# Patient Record
Sex: FEMALE | Race: WHITE | NOT HISPANIC OR LATINO | Employment: STUDENT | URBAN - METROPOLITAN AREA
[De-identification: names, ages, dates, MRNs, and addresses within clinical notes are randomized per-mention and may not be internally consistent; named-entity substitution may affect disease eponyms.]

---
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
## 2023-01-11 ENCOUNTER — SOCIAL WORK (OUTPATIENT)
Dept: BEHAVIORAL/MENTAL HEALTH CLINIC | Facility: CLINIC | Age: 18
End: 2023-01-11

## 2023-01-11 ENCOUNTER — TELEMEDICINE (OUTPATIENT)
Dept: PSYCHIATRY | Facility: CLINIC | Age: 18
End: 2023-01-11

## 2023-01-11 DIAGNOSIS — F43.10 POST TRAUMATIC STRESS DISORDER (PTSD): Primary | ICD-10-CM

## 2023-01-11 DIAGNOSIS — F41.1 GENERALIZED ANXIETY DISORDER: ICD-10-CM

## 2023-01-11 DIAGNOSIS — F60.3 BORDERLINE PERSONALITY DISORDER (HCC): ICD-10-CM

## 2023-01-11 DIAGNOSIS — F32.A DEPRESSION, UNSPECIFIED DEPRESSION TYPE: Primary | ICD-10-CM

## 2023-01-11 RX ORDER — LITHIUM CARBONATE 300 MG/1
300 CAPSULE ORAL
Qty: 30 CAPSULE | Refills: 1 | Status: SHIPPED | OUTPATIENT
Start: 2023-01-11

## 2023-01-11 NOTE — PSYCH
Encounter Diagnosis     ICD-10-CM    1  Post traumatic stress disorder (PTSD)  F43 10           Goals addressed in session: Goal 1     Pain:      none    0    Current suicide risk : Low     \[facility] [place] [facility]\"" engaged with the counselor around how things have been over the last few weeks  Lorenzo\[facility] [place] [facility]\"" explained that her grandfather recently passed away and that she has been having a hard time with dealing with his death  She explained that she does not deal with death well and explained that in the past when both of her grandmother's passed away she either attempted suicide or was hospitalized for ideations or self-harm  She explained that this time around she has had thoughts of self-harm however explained that Shakira Barraza has been helpful with keeping her safety and removing sharps from the home  Lorenzo\[facility] [place] [facility]\"" explained that she often avoids things that she does not want to feel and has a hard time accepting things as well  She talked about her relationship with her mother and explained that since her grandfather passed her mother has been around her more which makes \[facility] [place] [facility]\"" uncomfortable  She explained that she does not view her mother as a mom however she does wish that she had a mother that she could trust  \[facility] [place] [facility]\"" talked about the upcoming  and explained that she is not prepared to be there with her mom and other family members that she does not get along with however she will not miss it as she knows that she will regret it  The counselor talked with Brodie Marino and asked questions around why she avoids certain feelings and things she could do to let her emotions out in a way that will make her feel comfortable  \[facility] [place] [facility]\"" was oriented and neatly dressed, her eye contact and speech were normal, she denied all current safety concerns and denied any substance use  \[facility] [place] [facility]\"" was open and honest during the session and was able to answer harder questions without getting frustrated or shutting down  She was open to suggestions and feedback   A crisis plan was updated  Avril to work on boundary setting, doing things to safely let her emotions out, healthy communication with her supports, and coping skills  Future sessions scheduled  Behavioral Health Treatment Plan [facility]: Diagnosis and Treatment Plan explained to [facility] [place], [place] relates understanding diagnosis and is agreeable to Treatment Plan   Yes     Visit start and stop times:    01/11/23  Start Time: 1500  Stop Time: 1550  Total Visit Time: 50 minutes

## 2023-01-11 NOTE — PSYCH
Virtual Regular Visit    Verification of patient location:    Patient is located in the following state in which I hold an active license [place]      Assessment/Plan:    Problem List Items Addressed This Visit        Other    Depression - Primary    Relevant Medications    lithium carbonate 300 mg capsule    Anxiety disorder    Relevant Medications    lithium carbonate 300 mg capsule    Borderline personality disorder ([address] )    Relevant Medications    lithium carbonate 300 mg capsule       Goals addressed in session: Goal 1          Reason for visit is   Chief Complaint   Patient presents with   • Medication Management   • Follow-up        Encounter provider Valentino Pérez MD    Provider located at [id number] [facility]  [address]  [phone]      Recent Visits  No visits were found meeting these conditions  Showing recent visits within past 7 days and meeting all other requirements  Today's Visits  Date Type Provider Dept   01/11/23 Telemedicine Valentino éPrez, [facility] today's visits and meeting all other requirements  Future Appointments  No visits were found meeting these conditions  Showing future appointments within next 150 days and meeting all other requirements       The patient was identified by name and date of birth  Odalis Correa was informed that this is a telemedicine visit and that the [facility]  She agrees to proceed     My office door was closed  No one else was in the room  She acknowledged consent and understanding of privacy and security of the video platform  The patient has agreed to participate and understands they can discontinue the visit at any time  Patient is aware this is a billable service  Subjective  Odalis Correa is a 21 y o  female with mood swings , anger and anxiety presents for regular f/u      Compliant with meds, denies SE  Denies acute medical problems  Grandfather  last week  Poor relationship with mother  Pt lives with a friend and friend`s 3 children - 8,6,3 y o  Pt works  Pt was dx with BPD and asked about tx options - DBT recommended      HPI   Mood - improving - less anger, less mood swings; better energy and functioning  Anxiety - improving    Past Medical History:   Diagnosis Date   • Anxiety    • Asthma    • Depression    • Disruptive mood dysregulation disorder ([facility] [address] )    • Psychiatric disorder    • Vasovagal near syncope        Past Surgical History:   Procedure Laterality Date   • WISDOM TOOTH EXTRACTION         Current Outpatient Medications   Medication Sig Dispense Refill   • DULoxetine (CYMBALTA) 20 mg capsule Take 2 capsules daily 60 capsule 1   • lithium carbonate 300 mg capsule Take 1 capsule (300 mg total) by mouth daily at bedtime 30 capsule 1   • albuterol (Ventolin HFA) 90 mcg/act inhaler Inhale 2 puffs every 4 (four) hours as needed for wheezing or shortness of breath 1 Inhaler 0   • Levonorgestrel (Liletta, 52 MG,) 19 5 MCG/DAY IUD IUD 1 each by Intrauterine route once       No current facility-administered medications for this visit  Allergies   Allergen Reactions   • Augmentin [Amoxicillin-Pot Clavulanate] Rash   • Penicillins Hives       Review of Systems   Constitutional: Negative for activity change and appetite change  Psychiatric/Behavioral: Negative for sleep disturbance and suicidal ideas  Video Exam    There were no vitals filed for this visit  Physical Exam  Constitutional:       Appearance: Normal appearance  She is normal weight  Neurological:      Mental Status: She is alert  Psychiatric:         Attention and Perception: Attention and perception normal          Mood and Affect: Mood normal  Affect is blunt  Speech: Speech normal          Behavior: Behavior normal  Behavior is cooperative  Thought Content:  Thought content normal  Judgment: Judgment normal           Visit Time    Visit Start Time: 1 00 pm   Visit Stop Time: 1 05 pm   Total Visit Duration: 15 minutes

## 2023-01-25 ENCOUNTER — TELEPHONE (OUTPATIENT)
Dept: BEHAVIORAL/MENTAL HEALTH CLINIC | Facility: CLINIC | Age: 18
End: 2023-01-25

## 2023-01-25 ENCOUNTER — TELEMEDICINE (OUTPATIENT)
Dept: BEHAVIORAL/MENTAL HEALTH CLINIC | Facility: CLINIC | Age: 18
End: 2023-01-25

## 2023-01-25 DIAGNOSIS — F41.9 ANXIETY DISORDER, UNSPECIFIED TYPE: Primary | ICD-10-CM

## 2023-01-25 NOTE — PSYCH
No Call  No Show  No Charge    Aline Caller no showed 01/25/23 appointment , staff called and left message to reschedule appointment     Treatment Plan not due at this session

## 2023-02-01 ENCOUNTER — TELEPHONE (OUTPATIENT)
Dept: PSYCHIATRY | Facility: CLINIC | Age: 18
End: 2023-02-01

## 2023-02-02 DIAGNOSIS — F32.A DEPRESSION, UNSPECIFIED DEPRESSION TYPE: ICD-10-CM

## 2023-02-02 DIAGNOSIS — F41.1 GENERALIZED ANXIETY DISORDER: ICD-10-CM

## 2023-02-02 RX ORDER — DULOXETIN HYDROCHLORIDE 20 MG/1
CAPSULE, DELAYED RELEASE ORAL
Qty: 18 CAPSULE | Refills: 0 | Status: SHIPPED | OUTPATIENT
Start: 2023-02-02 | End: 2023-02-08 | Stop reason: SDUPTHER

## 2023-02-02 NOTE — TELEPHONE ENCOUNTER
Pharmacy stating they did not receive duloxetine prescription on 1/31  Resending for approval as patient is calling in need of medicine

## 2023-02-07 ENCOUNTER — TELEPHONE (OUTPATIENT)
Dept: BEHAVIORAL/MENTAL HEALTH CLINIC | Facility: CLINIC | Age: 18
End: 2023-02-07

## 2023-02-07 DIAGNOSIS — F41.9 ANXIETY DISORDER, UNSPECIFIED TYPE: Primary | ICD-10-CM

## 2023-02-08 ENCOUNTER — TELEMEDICINE (OUTPATIENT)
Dept: PSYCHIATRY | Facility: CLINIC | Age: 18
End: 2023-02-08

## 2023-02-08 DIAGNOSIS — F32.A DEPRESSION, UNSPECIFIED DEPRESSION TYPE: Primary | ICD-10-CM

## 2023-02-08 DIAGNOSIS — F41.1 GENERALIZED ANXIETY DISORDER: ICD-10-CM

## 2023-02-08 RX ORDER — LITHIUM CARBONATE 300 MG/1
300 CAPSULE ORAL
Qty: 30 CAPSULE | Refills: 1 | Status: SHIPPED | OUTPATIENT
Start: 2023-02-08

## 2023-02-08 RX ORDER — DULOXETIN HYDROCHLORIDE 20 MG/1
CAPSULE, DELAYED RELEASE ORAL
Qty: 60 CAPSULE | Refills: 1 | Status: SHIPPED | OUTPATIENT
Start: 2023-02-08

## 2023-02-08 NOTE — PSYCH
Virtual Regular Visit    Verification of patient location:    Patient is located in the following state in which I hold an active license [place]      Assessment/Plan:    Problem List Items Addressed This Visit        Other    Depression - Primary    Relevant Medications    lithium carbonate 300 mg capsule    DULoxetine (CYMBALTA) 20 mg capsule    Anxiety disorder    Relevant Medications    lithium carbonate 300 mg capsule    DULoxetine (CYMBALTA) 20 mg capsule       Goals addressed in session: Goal 1          Reason for visit is   Chief Complaint   Patient presents with   • Medication Management        Encounter provider Masha Schulz MD    Provider located at [id number] [facility]  [address]  [phone]      Recent Visits  Date Type Provider Dept   02/01/23 Telephone Alexsupriya Agu[address]apatric [address]    Showing recent visits within past 7 days and meeting all other requirements  Today's Visits  Date Type Provider Dept   02/08/23 Telemedicine Te Hauser Rings today's visits and meeting all other requirements  Future Appointments  No visits were found meeting these conditions  Showing future appointments within next 150 days and meeting all other requirements       The patient was identified by name and date of birth  Kimberlyn Mckinnon was informed that this is a telemedicine visit and that the [facility]  She agrees to proceed     My office door was closed  No one else was in the room  She acknowledged consent and understanding of privacy and security of the video platform  The patient has agreed to participate and understands they can discontinue the visit at any time  Patient is aware this is a billable service  Subjective  Kimberlyn Mckinnon is a 21 y o  female with depression and anxiety presents for regular f/u      Compliant with meds, SE lithium - acne like skin rash, face  Denies acute medical problems  Blood work not done yet      HPI   Mood - improved - less depressed, less mood swings, less anger  Better energy and functioning  Anxiety - controlled; denies panic attacks or racing houghts    Past Medical History:   Diagnosis Date   • Anxiety    • Asthma    • Depression    • Disruptive mood dysregulation disorder ([facility] [address] )    • Psychiatric disorder    • Vasovagal near syncope        Past Surgical History:   Procedure Laterality Date   • WISDOM TOOTH EXTRACTION         Current Outpatient Medications   Medication Sig Dispense Refill   • DULoxetine (CYMBALTA) 20 mg capsule Take 2 capsules daily 60 capsule 1   • lithium carbonate 300 mg capsule Take 1 capsule (300 mg total) by mouth daily at bedtime 30 capsule 1   • albuterol (Ventolin HFA) 90 mcg/act inhaler Inhale 2 puffs every 4 (four) hours as needed for wheezing or shortness of breath 1 Inhaler 0   • Levonorgestrel (Liletta, 52 MG,) 19 5 MCG/DAY IUD IUD 1 each by Intrauterine route once       No current facility-administered medications for this visit  Allergies   Allergen Reactions   • Augmentin [Amoxicillin-Pot Clavulanate] Rash   • Penicillins Hives       Review of Systems   Constitutional: Negative for activity change and appetite change  Psychiatric/Behavioral: Negative for dysphoric mood, sleep disturbance and suicidal ideas  The patient is not nervous/anxious  Video Exam    There were no vitals filed for this visit  Physical Exam  Constitutional:       Appearance: Normal appearance  She is normal weight  Neurological:      Mental Status: She is alert  Psychiatric:         Attention and Perception: Attention and perception normal          Mood and Affect: Mood and affect normal          Speech: Speech normal          Behavior: Behavior normal  Behavior is cooperative  Thought Content:  Thought content normal          Judgment: Judgment normal           Visit Time    Visit Start Time: 1 40 pm   Visit Stop Time: 1 47 pm   Total Visit Duration: 15 minutes

## 2023-02-15 ENCOUNTER — SOCIAL WORK (OUTPATIENT)
Dept: BEHAVIORAL/MENTAL HEALTH CLINIC | Facility: CLINIC | Age: 18
End: 2023-02-15

## 2023-02-15 DIAGNOSIS — F31.9 BIPOLAR 1 DISORDER (HCC): Primary | ICD-10-CM
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
## 2023-02-20 ENCOUNTER — TELEPHONE (OUTPATIENT)
Dept: PEDIATRICS CLINIC | Facility: CLINIC | Age: 18
End: 2023-02-20

## 2023-02-20 NOTE — TELEPHONE ENCOUNTER
Candie Martin would like to discuss her medication situation with you. Please call her @ [phone]    2:11 pm   I left a voicemail on [place] phone stating we cannot discuss medications with her. She must continue treatment and meds with her psychiatrist. If she has any further questions, I told her to call our office.  Thank you

## 2023-03-08 ENCOUNTER — SOCIAL WORK (OUTPATIENT)
Dept: BEHAVIORAL/MENTAL HEALTH CLINIC | Facility: CLINIC | Age: 18
End: 2023-03-08

## 2023-03-08 ENCOUNTER — TELEMEDICINE (OUTPATIENT)
Dept: PSYCHIATRY | Facility: CLINIC | Age: 18
End: 2023-03-08

## 2023-03-08 DIAGNOSIS — F32.A DEPRESSION, UNSPECIFIED DEPRESSION TYPE: Primary | ICD-10-CM

## 2023-03-08 DIAGNOSIS — F41.1 GENERALIZED ANXIETY DISORDER: Primary | ICD-10-CM

## 2023-03-08 DIAGNOSIS — F41.1 GENERALIZED ANXIETY DISORDER: ICD-10-CM

## 2023-03-08 RX ORDER — LITHIUM CARBONATE 300 MG/1
300 CAPSULE ORAL
Qty: 30 CAPSULE | Refills: 1 | Status: SHIPPED | OUTPATIENT
Start: 2023-03-08

## 2023-03-08 RX ORDER — DULOXETIN HYDROCHLORIDE 20 MG/1
CAPSULE, DELAYED RELEASE ORAL
Qty: 60 CAPSULE | Refills: 1 | Status: SHIPPED | OUTPATIENT
Start: 2023-03-08

## 2023-03-08 NOTE — PSYCH
Virtual Regular Visit    Verification of patient location:    Patient is located in the following state in which I hold an active license [place]      Assessment/Plan:    Problem List Items Addressed This Visit        Other    Depression - Primary    Relevant Medications    lithium carbonate 300 mg capsule    DULoxetine (CYMBALTA) 20 mg capsule    Anxiety disorder    Relevant Medications    lithium carbonate 300 mg capsule    DULoxetine (CYMBALTA) 20 mg capsule       Goals addressed in session: Goal 1          Reason for visit is   Chief Complaint   Patient presents with   • Medication Management        Encounter provider Lizz Lee MD    Provider located at [id number] [facility]  [address]  [phone]      Recent Visits  No visits were found meeting these conditions  Showing recent visits within past 7 days and meeting all other requirements  Today's Visits  Date Type Provider Dept   03/08/23 Telemedicine Lizz Lee, [facility] today's visits and meeting all other requirements  Future Appointments  No visits were found meeting these conditions  Showing future appointments within next 150 days and meeting all other requirements       The patient was identified by name and date of birth  Sg Rodriguez was informed that this is a telemedicine visit and that the [facility]  She agrees to proceed     My office door was closed  No one else was in the room  She acknowledged consent and understanding of privacy and security of the video platform  The patient has agreed to participate and understands they can discontinue the visit at any time  Patient is aware this is a billable service       Subjective  Sg Rodriguez is a 21 y o  female with depression and anxiety presents for regular f/u     compliant with meds, SE  acne - " not bad"  Works, lives with a friend  Blood work not done yet     HPI   Mood - improving - mood swings are less often, shorter, less severe  Better energy and functioning  Anxiety - controlled      Past Medical History:   Diagnosis Date   • Anxiety    • Asthma    • Depression    • Disruptive mood dysregulation disorder ([facility] [address] )    • Psychiatric disorder    • Vasovagal near syncope        Past Surgical History:   Procedure Laterality Date   • WISDOM TOOTH EXTRACTION         Current Outpatient Medications   Medication Sig Dispense Refill   • DULoxetine (CYMBALTA) 20 mg capsule Take 2 capsules daily 60 capsule 1   • lithium carbonate 300 mg capsule Take 1 capsule (300 mg total) by mouth daily at bedtime 30 capsule 1   • albuterol (Ventolin HFA) 90 mcg/act inhaler Inhale 2 puffs every 4 (four) hours as needed for wheezing or shortness of breath 1 Inhaler 0   • Levonorgestrel (Liletta, 52 MG,) 19 5 MCG/DAY IUD IUD 1 each by Intrauterine route once       No current facility-administered medications for this visit  Allergies   Allergen Reactions   • Augmentin [Amoxicillin-Pot Clavulanate] Rash   • Penicillins Hives       Review of Systems   Constitutional: Negative for activity change and appetite change  Psychiatric/Behavioral: Negative for dysphoric mood, sleep disturbance and suicidal ideas  The patient is not nervous/anxious  Video Exam    There were no vitals filed for this visit  Physical Exam  Constitutional:       Appearance: Normal appearance  She is normal weight  Neurological:      Mental Status: She is alert  Psychiatric:         Attention and Perception: Attention and perception normal          Mood and Affect: Mood and affect normal          Speech: Speech normal          Behavior: Behavior normal  Behavior is cooperative  Thought Content:  Thought content normal          Judgment: Judgment normal           Visit Time    Visit Start Time: 3 57 pm   Visit Stop Time: 4 05 pm   Total Visit Duration: 15 minutes

## 2023-03-14 ENCOUNTER — SOCIAL WORK (OUTPATIENT)
Dept: BEHAVIORAL/MENTAL HEALTH CLINIC | Facility: CLINIC | Age: 18
End: 2023-03-14

## 2023-03-14 DIAGNOSIS — F31.9 BIPOLAR 1 DISORDER (HCC): Primary | ICD-10-CM
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
## 2023-03-29 ENCOUNTER — SOCIAL WORK (OUTPATIENT)
Dept: BEHAVIORAL/MENTAL HEALTH CLINIC | Facility: CLINIC | Age: 18
End: 2023-03-29

## 2023-03-29 DIAGNOSIS — F41.9 ANXIETY DISORDER, UNSPECIFIED TYPE: Primary | ICD-10-CM

## 2023-03-29 NOTE — PSYCH
Behavioral Health Psychotherapy Progress Note    Psychotherapy Provided: Individual Psychotherapy     1  Anxiety disorder, unspecified type            Goals addressed in session: Goal 1     DATA: Martha Alonso engaged with the counselor around her mother  She explained that over the last week her mother talked with both her and her younger sister and explained that she will be moving to [place] before the end of next month  Martha Alonso explained that she does not like this idea and feels like her mother is running away from her responsibilities again and is also abandoning [place] and her sister which is something that she has done in the past  Martha Alonso explained that she was getting back to the point where she felt like she wanted to explore a relationship with her mother and explained that her sister was also starting to open up more around their mom  Martha Alonso explained that she got things off of her chest to her mother after the conversation and is now focused on grieving the relationship  Martha Alonso and the counselor talked in depth around how she is feeling currently and talked about active coping skills that will help her to release some of her anger and grief like getting into boxing, going to a batting cage, etc    During this session, this clinician used the following therapeutic modalities: Cognitive Behavioral Therapy    Substance Abuse was not addressed during this session  If the client is diagnosed with a co-occurring substance use disorder, please indicate any changes in the frequency or amount of use:   Stage of change for addressing substance use diagnoses: No substance use/Not applicable    ASSESSMENT:  Moreno Villagomez presents with a Euthymic/ normal mood  her affect is Normal range and intensity, which is congruent, with her mood and the content of the session  The client has made progress on their goals      Martha Alonso was oriented and neatly dressed, her eye contact and speech were normal,  Wanchese Specking presents "with a none risk of suicide, none risk of self-harm, and none risk of harm to others  For any risk assessment that surpasses a \"low\" rating, a safety plan must be developed  A safety plan was indicated: no  If yes, describe in detail     PLAN: Between sessions, Ly Freedman will work on finding physical ways to release her emotions (Anger, grief, etc ) like batting cages, smash rooms, boxing  At the next session, the therapist will use Cognitive Behavioral Therapy to address grief/ family issues   Behavioral Health Treatment Plan and Discharge Planning: Ly Freedman is aware of and agrees to continue to work on their treatment plan  They have identified and are working toward their discharge goals   yes    Visit start and stop times:    03/29/23  Start Time: 1300  Stop Time: 1345  Total Visit Time: 45 minutes  "

## 2023-05-10 ENCOUNTER — SOCIAL WORK (OUTPATIENT)
Dept: BEHAVIORAL/MENTAL HEALTH CLINIC | Facility: CLINIC | Age: 18
End: 2023-05-10

## 2023-05-10 DIAGNOSIS — F41.9 ANXIETY DISORDER, UNSPECIFIED TYPE: Primary | ICD-10-CM

## 2023-05-10 NOTE — PSYCH
Behavioral Health Psychotherapy Progress Note    Psychotherapy Provided: Individual Psychotherapy     1  Anxiety disorder, unspecified type            Goals addressed in session: Goal 1     DATA: Nadira Blanton engaged with the counselor around how she has been  She talked about her parents, work, and her future at school  She explained that she will be starting classes in the fall and is excited however nervous as she stated that she has never been good at school  She explained that she has services set up with the college and plans to meet with her advisor in a few days  Nadira Blanton talked about her parents and explained that she does not want to speak with her mother  She stated that her Aunt (whom her mother moved to [facility] with) was in town over the weekend and Nadira Gilletterahel met with her to tell her to ask her mother to stop contacting her and her sister  Nadira Blanton stated that she feels like the message will get received  She talked about mother's day and explained that it is always a hard day for her since she has been grieving the missing mother/daughter relationship that she has always wanted  Nadira Blanton talked about her sister and explained that she wants her sister to move in with her and Ed President since her sister has been butting heads with their father  During this session, this clinician used the following therapeutic modalities: Cognitive Behavioral Therapy    Substance Abuse was not addressed during this session  If the client is diagnosed with a co-occurring substance use disorder, please indicate any changes in the frequency or amount of use:   Stage of change for addressing substance use diagnoses: No substance use/Not applicable    ASSESSMENT:  Umm Smith presents with a Euthymic/ normal mood  her affect is Normal range and intensity, which is congruent, with her mood and the content of the session  The client has made progress on their goals      Nadira Blanton was oriented and neatly dressed, her eye contact and speech "were normal, she denied all safety concern and substance use  Flor Garcia presents with a none risk of suicide, none risk of self-harm, and none risk of harm to others  For any risk assessment that surpasses a \"low\" rating, a safety plan must be developed  A safety plan was indicated: no  If yes, describe in detail     PLAN: Between sessions, Flor Garcia will continue with healthy boundary setting  At the next session, the therapist will use Cognitive Behavioral Therapy to address daily stressors  Behavioral Health Treatment Plan and Discharge Planning: Flor Garcia is aware of and agrees to continue to work on their treatment plan  They have identified and are working toward their discharge goals   yes    Visit start and stop times:    05/10/23  Start Time: 1400  Stop Time: 2663  Total Visit Time: 45 minutes  "

## 2023-05-11 ENCOUNTER — TELEMEDICINE (OUTPATIENT)
Dept: PSYCHIATRY | Facility: CLINIC | Age: 18
End: 2023-05-11

## 2023-05-11 DIAGNOSIS — F32.A DEPRESSION, UNSPECIFIED DEPRESSION TYPE: Primary | ICD-10-CM

## 2023-05-11 DIAGNOSIS — F41.1 GENERALIZED ANXIETY DISORDER: ICD-10-CM

## 2023-05-11 DIAGNOSIS — F60.3 BORDERLINE PERSONALITY DISORDER (HCC): ICD-10-CM

## 2023-05-11 RX ORDER — PROPRANOLOL HYDROCHLORIDE 10 MG/1
10 TABLET ORAL DAILY
Qty: 30 TABLET | Refills: 1 | Status: SHIPPED | OUTPATIENT
Start: 2023-05-11

## 2023-05-11 RX ORDER — DULOXETIN HYDROCHLORIDE 20 MG/1
CAPSULE, DELAYED RELEASE ORAL
Qty: 60 CAPSULE | Refills: 1 | Status: SHIPPED | OUTPATIENT
Start: 2023-05-11

## 2023-05-11 RX ORDER — LITHIUM CARBONATE 300 MG/1
300 CAPSULE ORAL
Qty: 30 CAPSULE | Refills: 1 | Status: SHIPPED | OUTPATIENT
Start: 2023-05-11

## 2023-05-11 NOTE — PSYCH
Virtual Regular Visit    Verification of patient location:    Patient is located at Other in the following state in which I hold an active license [place]      Assessment/Plan:    Problem List Items Addressed This Visit    None      Goals addressed in session: Goal 1          Reason for visit is   Chief Complaint   Patient presents with   • Medication Management        Encounter provider Junior Stephens MD    Provider located at [address] [facility] [address]  [address] [place] [address]  [address]  [facility] [address] [facility]  [phone]      Recent Visits  No visits were found meeting these conditions  Showing recent visits within past 7 days and meeting all other requirements  Today's Visits  Date Type Provider Dept   05/11/23 Telemedicine Junior Stephens, [address] [facility] today's visits and meeting all other requirements  Future Appointments  No visits were found meeting these conditions  Showing future appointments within next 150 days and meeting all other requirements       The patient was identified by name and date of birth  Bernadette Castellon was informed that this is a telemedicine visit and that the visit is being conducted thr[facility]  She agrees to proceed     My office door was closed  No one else was in the room  She acknowledged consent and understanding of privacy and security of the video platform  The patient has agreed to participate and understands they can discontinue the visit at any time  Patient is aware this is a billable service  Subjective  Bernadette Castellon is a 19 y o  female with mood swings and anxiety presents for regular f/u      Compliant with meds, SE acne - improved  C/o shoulder pain  No other changes   Pt denies h/o asthma , but there is h/o exercise induced asthma; not on meds   Blood work not done      HPI   Mood - reports as mostly good and stable; denies depression; functions at baseline  Anxiety - c/o worsening of "anxiety for a year - daily, \" all the time\" - tight chest, SOB, GI spx, teeth grinding  Denies racing thoughts    Past Medical History:   Diagnosis Date   • Anxiety    • Asthma    • Depression    • Disruptive mood dysregulation disorder ([facility] [address] )    • Psychiatric disorder    • Vasovagal near syncope        Past Surgical History:   Procedure Laterality Date   • WISDOM TOOTH EXTRACTION         Current Outpatient Medications   Medication Sig Dispense Refill   • albuterol (Ventolin HFA) 90 mcg/act inhaler Inhale 2 puffs every 4 (four) hours as needed for wheezing or shortness of breath 1 Inhaler 0   • DULoxetine (CYMBALTA) 20 mg capsule Take 2 capsules daily 60 capsule 1   • Levonorgestrel (Liletta, 52 MG,) 19 5 MCG/DAY IUD IUD 1 each by Intrauterine route once     • lithium carbonate 300 mg capsule Take 1 capsule (300 mg total) by mouth daily at bedtime 30 capsule 1     No current facility-administered medications for this visit  Allergies   Allergen Reactions   • Augmentin [Amoxicillin-Pot Clavulanate] Rash   • Penicillins Hives       Review of Systems   Constitutional: Negative for activity change and appetite change  Psychiatric/Behavioral: Negative for dysphoric mood, sleep disturbance and suicidal ideas  The patient is nervous/anxious  Video Exam    There were no vitals filed for this visit  Physical Exam  Constitutional:       Appearance: Normal appearance  She is normal weight  Neurological:      Mental Status: She is alert  Psychiatric:         Attention and Perception: Attention and perception normal          Mood and Affect: Affect normal  Mood is anxious  Speech: Speech normal          Behavior: Behavior normal  Behavior is cooperative  Thought Content:  Thought content normal          Judgment: Judgment normal           Visit Time    Visit Start Time: 2 57 pm   Visit Stop Time: 3 05 pm   Total Visit Duration: 18 minutes      "

## 2023-05-11 NOTE — PATIENT INSTRUCTIONS
Continue lithium and cymbalta  Add propranolol 10 mg - will monitor for asthma; pt was educated in possible SE

## 2023-06-07 ENCOUNTER — SOCIAL WORK (OUTPATIENT)
Dept: BEHAVIORAL/MENTAL HEALTH CLINIC | Facility: CLINIC | Age: 18
End: 2023-06-07
Payer: COMMERCIAL

## 2023-06-07 DIAGNOSIS — F43.10 POST TRAUMATIC STRESS DISORDER (PTSD): Primary | ICD-10-CM

## 2023-06-07 DIAGNOSIS — F32.A DEPRESSION, UNSPECIFIED DEPRESSION TYPE: ICD-10-CM

## 2023-06-07 PROCEDURE — 90837 PSYTX W PT 60 MINUTES: CPT

## 2023-06-07 NOTE — PSYCH
Behavioral Health Psychotherapy Progress Note    Psychotherapy Provided: Individual Psychotherapy     1  Post traumatic stress disorder (PTSD)        2  Depression, unspecified depression type            Goals addressed in session: Goal 1     DATA: Martha Alonso engaged with the counselor around how she has been feeling  She explained that she has been very angry and sad due to thinking about her mother  She talked about how she saw a video of her mother in [place] out in clubs and being with random people  She stated that both her and her sister saw the video and stated that her sister was not very affected by it however she was and stated that it triggered her  She talked about her childhood with her mother and explained that she remembers experiencing her mother in that way and stated that seeing the video brought those emotions back up for her  She talked about her anger and explained that she is unaware of how to handle her emotions when it comes to this and stated that she will either cry or bottle her emotions  She stated that she has been having urges to self-harm however has been able to stay away from this  She and the counselor talked about healthy and controlled ways for her to release her anger whether it be at the gym, going to a smash room, etc    During this session, this clinician used the following therapeutic modalities: Cognitive Behavioral Therapy    Substance Abuse was not addressed during this session  If the client is diagnosed with a co-occurring substance use disorder, please indicate any changes in the frequency or amount of use:   Stage of change for addressing substance use diagnoses: No substance use/Not applicable    ASSESSMENT:  Gonzalo Kwon presents with a Angry and Depressed mood  her affect is Normal range and intensity, which is congruent, with her mood and the content of the session  The client has made progress on their goals      Martha Alonso was oriented and neatly dressed, her eye "contact and speech were normal, she denied all safety concerns however stated that she has been having urges to self-harm  Jayla Pierson presents with a none risk of suicide, minimal risk of self-harm, and none risk of harm to others  For any risk assessment that surpasses a \"low\" rating, a safety plan must be developed  A safety plan was indicated: no  If yes, describe in detail     PLAN: Between sessions, Jayla Pierson will work on coping skills and trying new activities like the gym or boxing to help with releasing anger and frustration  At the next session, the therapist will use Cognitive Behavioral Therapy to address anger/ childhood trauma  Behavioral Health Treatment Plan and Discharge Planning: Jayla Pierson is aware of and agrees to continue to work on their treatment plan  They have identified and are working toward their discharge goals   yes    Visit start and stop times:    06/07/23  Start Time: 1406  Stop Time: 1508  Total Visit Time: 62 minutes  "

## 2023-06-08 ENCOUNTER — TELEMEDICINE (OUTPATIENT)
Dept: PSYCHIATRY | Facility: CLINIC | Age: 18
End: 2023-06-08
Payer: COMMERCIAL

## 2023-06-08 DIAGNOSIS — F32.A DEPRESSION, UNSPECIFIED DEPRESSION TYPE: Primary | ICD-10-CM

## 2023-06-08 DIAGNOSIS — F41.1 GENERALIZED ANXIETY DISORDER: ICD-10-CM

## 2023-06-08 PROCEDURE — 99214 OFFICE O/P EST MOD 30 MIN: CPT | Performed by: PSYCHIATRY & NEUROLOGY

## 2023-06-08 RX ORDER — LITHIUM CARBONATE 450 MG
450 TABLET, EXTENDED RELEASE ORAL DAILY
Qty: 30 TABLET | Refills: 1 | Status: SHIPPED | OUTPATIENT
Start: 2023-06-08

## 2023-06-08 RX ORDER — DULOXETIN HYDROCHLORIDE 60 MG/1
60 CAPSULE, DELAYED RELEASE ORAL DAILY
Qty: 30 CAPSULE | Refills: 1 | Status: SHIPPED | OUTPATIENT
Start: 2023-06-08

## 2023-06-08 RX ORDER — PROPRANOLOL HYDROCHLORIDE 10 MG/1
10 TABLET ORAL DAILY
Qty: 30 TABLET | Refills: 1 | Status: SHIPPED | OUTPATIENT
Start: 2023-06-08

## 2023-06-08 NOTE — PSYCH
"  Virtual Regular Visit    Verification of patient location:    Patient is located at Other in the following state in which I hold an active license [place]      Assessment/Plan:    Problem List Items Addressed This Visit        Other    Anxiety disorder       Goals addressed in session: Goal 1          Reason for visit is   Chief Complaint   Patient presents with   • Medication Management        Encounter provider Christina Nagy MD    Provider located at [address] [facility] [address]  [address] [facility]  [address]  [phone]      Recent Visits  No visits were found meeting these conditions  Showing recent visits within past 7 days and meeting all other requirements  Today's Visits  Date Type Provider Dept   06/08/23 Telemedicine Christina Nagy, [address] [facility] today's visits and meeting all other requirements  Future Appointments  No visits were found meeting these conditions  Showing future appointments within next 150 days and meeting all other requirements       The patient was identified by name and date of birth  Jon Ochoa was informed that this is a telemedicine visit and that the visit is being [facility]  She agrees to proceed     My office door was closed  No one else was in the room  She acknowledged consent and understanding of privacy and security of the video platform  The patient has agreed to participate and understands they can discontinue the visit at any time  Patient is aware this is a billable service  Subjective  Jon Ochoa is a 19 y o  female with depression and anxiety presents for regular f/u      Compliant with meds, denies SE  No recent blood work  Moving in with a friend      HPI   Mood - worse mood swings - daily episodes for 1-2 hrs of sadness, low self esteem, urges to SIB,  Anxiety - less physical spx, ( propranolol helps), but more negative racing thoughts, \" heavy " "chest\"    Past Medical History:   Diagnosis Date   • Anxiety    • Asthma    • Depression    • Disruptive mood dysregulation disorder ([facility] [address] )    • Psychiatric disorder    • Vasovagal near syncope        Past Surgical History:   Procedure Laterality Date   • WISDOM TOOTH EXTRACTION         Current Outpatient Medications   Medication Sig Dispense Refill   • propranolol (INDERAL) 10 mg tablet Take 1 tablet (10 mg total) by mouth daily 30 tablet 1   • albuterol (Ventolin HFA) 90 mcg/act inhaler Inhale 2 puffs every 4 (four) hours as needed for wheezing or shortness of breath 1 Inhaler 0   • Levonorgestrel (Liletta, 52 MG,) 19 5 MCG/DAY IUD IUD 1 each by Intrauterine route once       No current facility-administered medications for this visit  Allergies   Allergen Reactions   • Augmentin [Amoxicillin-Pot Clavulanate] Rash   • Penicillins Hives       Review of Systems   Constitutional: Positive for appetite change (low)  Negative for activity change  Psychiatric/Behavioral: Positive for dysphoric mood, self-injury (urges) and sleep disturbance (poor sleep)  Negative for suicidal ideas  Video Exam    There were no vitals filed for this visit  Physical Exam  Constitutional:       Appearance: Normal appearance  She is normal weight  Neurological:      Mental Status: She is alert  Psychiatric:         Attention and Perception: Attention and perception normal          Mood and Affect: Affect normal  Mood is depressed  Speech: Speech normal          Behavior: Behavior normal  Behavior is cooperative  Thought Content:  Thought content normal          Judgment: Judgment normal           Visit Time    Visit Start Time: 2 38 pm   Visit Stop Time: 2 43 pm   Total Visit Duration: 15 minutes      "

## 2023-06-21 ENCOUNTER — SOCIAL WORK (OUTPATIENT)
Dept: BEHAVIORAL/MENTAL HEALTH CLINIC | Facility: CLINIC | Age: 18
End: 2023-06-21
Payer: COMMERCIAL

## 2023-06-21 DIAGNOSIS — F43.10 POST TRAUMATIC STRESS DISORDER (PTSD): Primary | ICD-10-CM

## 2023-06-21 PROCEDURE — 90834 PSYTX W PT 45 MINUTES: CPT

## 2023-06-21 NOTE — BH TREATMENT PLAN
[address]  2005     Date of Initial Psychotherapy Assessment: 6/21/2023  Date of Current Treatment Plan: 06/21/23  Treatment Plan Target Date: 6/21/23  Treatment Plan Expiration Date: 12/21/23    Diagnosis:   1  Post traumatic stress disorder (PTSD)            Area(s) of Need: Anger and responses    Long Term Goal 1 (in the client's own words): Get through and complete first semester of college     Stage of Change: Preparation    Target Date for completion: 12/21/2023     Anticipated therapeutic modalities: CBT     People identified to complete this goal: Cindi COOK      Objective 1: (identify the means of measuring success in meeting the objective): Stay on top of school work and manage time       Objective 2: (identify the means of measuring success in meeting the objective): use school resources for help and support if needed      Long Term Goal 2 (in the client's own words): get fully settled in the new house    Stage of Change: Preparation    Target Date for completion: 6/21/23     Anticipated therapeutic modalities: CBT     People identified to complete this goal: Martha COOK      Objective 1: (identify the means of measuring success in meeting the objective): work with everyone to unpack      Objective 2: (identify the means of measuring success in meeting the objective): n/a     Long Term Goal 3 (in the client's own words):  Work on having less screen time (spend more time doing other activities like journal or being outside)    Stage of Change: Preparation    Target Date for completion: 12/21/23     Anticipated therapeutic modalities: CBT     People identified to complete this goal: Martha COOK      Objective 1: (identify the means of measuring success in meeting the objective): find other things to do with my time      Objective 2: (identify the means of measuring success in meeting the objective): go outside more      I am currently under the care of a Garret Ruelas psychiatric provider: yes    My [facility]'s psychiatric provider is: Dev Tristan am currently taking psychiatric medications: Yes, as prescribed    I feel that I will be ready for discharge from mental health care when I reach the following (measurable goal/objective): When I am able to better manage my anger    For children and adults who have a legal guardian:   Has there been any change to custody orders and/or guardianship status? NA  If yes, attach updated documentation  I have created my Crisis Plan and have been offered a copy of this plan    [address]: Diagnosis and Treatment Plan explained to Martha acknowledges an understanding of their diagnosis  Meagan Burleson agrees to this treatment plan      I have been offered a copy of this Treatment Plan  yes

## 2023-06-21 NOTE — PSYCH
Behavioral Health Psychotherapy Progress Note    Psychotherapy Provided: Individual Psychotherapy     1  Post traumatic stress disorder (PTSD)            Goals addressed in session: Goal 1     DATA: Martha Alonso engaged with the counselor around how she has been doing  She explained that she, adriana and the kids moved into Adriana's mothers house over the weekend  She stated that she is mentally drained from the move but overall it was good and she is happy to be there as she feels like everyone is more comfortable and less stressed  Martha Alonso talked about her frustration with Adriana's mother as she is dealing with her divorce from [address] father  Martha Alonso explained that Adriana's father is very similar to Adriana's ex- Svetlana Fontaine as far as being very manipulative and other things  Martha Alonso talked about how everyone feels about him and stated that she does not like that he comes to the house often due to adriana's mother allowing him to do so  Martha Alonso explained that she talked with Didi Singh about talking to her mother about not allowing him back on the property  Lorenzo Celeste also talked about her sister and explained that they are not on the best terms due to her sister's smoking and drinking habits  [facility] explained that seeing her sister use these substances has been triggering due to what she has experienced with her mother  Martha Alonso and the counselor talked about this in depth and took time to help process her emotions and thoughts  During this session, this clinician used the following therapeutic modalities: Cognitive Behavioral Therapy    Substance Abuse was not addressed during this session  If the client is diagnosed with a co-occurring substance use disorder, please indicate any changes in the frequency or amount of use:   Stage of change for addressing substance use diagnoses: No substance use/Not applicable    ASSESSMENT:  David Sahu presents with a Euthymic/ normal mood       her affect is Normal range and intensity, which is congruent, "with her mood and the content of the session  The client has made progress on their goals  Liana Denney was oriented and neatly dressed, her eye contact and speech were normal, she denied all safety concerns  Harry Dela Cruz presents with a none risk of suicide, none risk of self-harm, and none risk of harm to others  For any risk assessment that surpasses a \"low\" rating, a safety plan must be developed  A safety plan was indicated: no  If yes, describe in detail created     PLAN: Between sessions, Harry Dela Cruz will continue with staying in control of her emotions and thinking before she reacts  Continue with coping skills and other activities that help with staying calm  At the next session, the therapist will use Cognitive Behavioral Therapy to address anger/past trauma  Behavioral Health Treatment Plan and Discharge Planning: Harry Dela Cruz is aware of and agrees to continue to work on their treatment plan  They have identified and are working toward their discharge goals   yes    Visit start and stop times:    06/21/23  Start Time: 0806  Stop Time: 0845  Total Visit Time: 39 minutes  "

## 2023-06-21 NOTE — BH CRISIS PLAN
"Client Name: David Sahu       Client YOB: 2005  : 2005    Treatment Team (include name and contact information):     Psychotherapist: Mahnaz COOK    Psychiatrist: Nilo Tilley   Release of information completed: no    \" n/a   Release of information completed: no    Other (Specify Role): n/a    Release of information completed: no    Other (Specify Role): n/a   Release of information completed: no    Healthcare Provider  ARIAS Roman  [address] [facility] [address]  [phone]    Type of Plan   * Child plans (children 15 yo and younger) must be completed and signed by the child's legal guardian   * Plans for all individuals 15 yo and above must be signed by the client  Plan Type: adolescent/adult (15 and over) Update/Review      My Personal Strengths are (in the client's own words):  \"Good worker\"  The stressors and triggers that may put me at risk are:  Mother, Substances, Fear of death of a loved one, school    Coping skills I can use to keep myself calm and safe:  Listen to music, Journal, Increased contact with professional supports and Other (describe) cleaning my car    Coping skills/supports I can use to maintain abstinence from substance use:   Not Applicable    The people that provide me with help and support: (Include name, contact, and how they can help)   Support person #1: Didi Singh (girlfriend)    * Phone number: in cell phone    * How can they help me? Always with me     Support person #2:Jose (friend)    * Phone number: in cell phone    * How can they help me? Good father figure      Support person #3: Mahnaz COOK    * Phone number: in cell phone    * How can they help me?  Listens and gives therapeutic support     In the past, the following has helped me in times of crisis:    Being in a quiet space, Taking medications, Talking to a professional on the telephone, Using de-escalation tools that I have learned and Listening to music      If " it is an emergency and you need immediate help, call 9-1-1    If there is a possibility of danger to yourself or others, call the following crisis hotline resources:     Adult Crisis Numbers  Suicide Prevention Hotline - Dial 9-8-8  [facility]: [address]: [address]: [phone]  [facility] [address] [facility] ([facility]): [phone]  [facility]: [address] [place] [address] Avenue: [address] [place] [address] St: [phone] (daytime)  [phone] (after hours, weekends, holidays)     Child/Adolescent Crisis Numbers   [facility]: [address]: [phone]   [place]: [phone]   [address] ([facility]): [phone]    Please note: Some [facility] do not have a separate number for Child/Adolescent specific crisis  If your county is not listed under Child/Adolescent, please call the adult number for your county     National Talk to Text Line   [place] - [phone]    In the event your feelings become unmanageable, and you cannot reach your support system, you will call 911 immediately or go to the nearest hospital emergency room

## 2023-07-13 ENCOUNTER — TELEMEDICINE (OUTPATIENT)
Dept: PSYCHIATRY | Facility: CLINIC | Age: 18
End: 2023-07-13
Payer: COMMERCIAL

## 2023-07-13 DIAGNOSIS — F41.1 GENERALIZED ANXIETY DISORDER: ICD-10-CM

## 2023-07-13 DIAGNOSIS — F32.A DEPRESSION, UNSPECIFIED DEPRESSION TYPE: Primary | ICD-10-CM

## 2023-07-13 PROCEDURE — 99214 OFFICE O/P EST MOD 30 MIN: CPT | Performed by: PSYCHIATRY & NEUROLOGY

## 2023-07-13 RX ORDER — PROPRANOLOL HYDROCHLORIDE 10 MG/1
10 TABLET ORAL DAILY
Qty: 30 TABLET | Refills: 2 | Status: SHIPPED | OUTPATIENT
Start: 2023-07-13

## 2023-07-13 RX ORDER — DULOXETIN HYDROCHLORIDE 60 MG/1
60 CAPSULE, DELAYED RELEASE ORAL DAILY
Qty: 30 CAPSULE | Refills: 2 | Status: SHIPPED | OUTPATIENT
Start: 2023-07-13

## 2023-07-13 RX ORDER — LITHIUM CARBONATE 450 MG
450 TABLET, EXTENDED RELEASE ORAL DAILY
Qty: 30 TABLET | Refills: 2 | Status: SHIPPED | OUTPATIENT
Start: 2023-07-13

## 2023-07-13 NOTE — PSYCH
Virtual Regular Visit    Verification of patient location:    Patient is located at Other in the following state in which I hold an active license [place]      Assessment/Plan:    Problem List Items Addressed This Visit    None      Goals addressed in session: Goal 1          Reason for visit is   Chief Complaint   Patient presents with   • Medication Management        Encounter provider Helga Win MD    Provider located at [address] [place] [address],[address]  [address]  [phone]      Recent Visits  No visits were found meeting these conditions. Showing recent visits within past 7 days and meeting all other requirements  Today's Visits  Date Type Provider Dept   07/13/23 Telemedicine Helga Win, [address] today's visits and meeting all other requirements  Future Appointments  No visits were found meeting these conditions. Showing future appointments within next 150 days and meeting all other requirements       The patient was identified by name and date of birth. Shu Barnett was informed that this is a telemedicine visit and that the visit is being conducted throughthe [employer]. She agrees to proceed. .  My office door was closed. No one else was in the room. She acknowledged consent and understanding of privacy and security of the video platform. The patient has agreed to participate and understands they can discontinue the visit at any time. Patient is aware this is a billable service. Subjective  Shu Barnett is a 19 y.o. female with depression and anxiety presents for regular f/u   compliant with meds, denies SE .   as per pt, she had blood work done at [facility]  - I have no results to review  Family problems  - no parents ` support  Lives with a friend and friend`s mother  works      HPI   Mood - improved - mood is mostly good and stable; denies depression or mood swings.  Active, social, functions at baseline  Anxiety - controlled on propranolol; denies panic attacks      Past Medical History:   Diagnosis Date   • Anxiety    • Asthma    • Depression    • Disruptive mood dysregulation disorder ([address])    • Psychiatric disorder    • Vasovagal near syncope        Past Surgical History:   Procedure Laterality Date   • WISDOM TOOTH EXTRACTION         Current Outpatient Medications   Medication Sig Dispense Refill   • albuterol (Ventolin HFA) 90 mcg/act inhaler Inhale 2 puffs every 4 (four) hours as needed for wheezing or shortness of breath 1 Inhaler 0   • DULoxetine (CYMBALTA) 60 mg delayed release capsule Take 1 capsule (60 mg total) by mouth daily 30 capsule 1   • Levonorgestrel (Liletta, 52 MG,) 19.5 MCG/DAY IUD IUD 1 each by Intrauterine route once     • lithium carbonate (LITHOBID) 450 mg CR tablet Take 1 tablet (450 mg total) by mouth daily 30 tablet 1   • propranolol (INDERAL) 10 mg tablet Take 1 tablet (10 mg total) by mouth daily 30 tablet 1     No current facility-administered medications for this visit. Allergies   Allergen Reactions   • Augmentin [Amoxicillin-Pot Clavulanate] Rash   • Penicillins Hives       Review of Systems   Constitutional: Negative for activity change and appetite change. Psychiatric/Behavioral: Negative for dysphoric mood, sleep disturbance and suicidal ideas. The patient is not nervous/anxious. Video Exam    There were no vitals filed for this visit. Physical Exam  Constitutional:       Appearance: Normal appearance. She is normal weight. Neurological:      Mental Status: She is alert. Psychiatric:         Attention and Perception: Attention and perception normal.         Mood and Affect: Mood and affect normal.         Speech: Speech normal.         Behavior: Behavior normal. Behavior is cooperative. Thought Content:  Thought content normal.         Judgment: Judgment normal.          Visit Time    Visit Start Time: 3.18 pm   Visit Stop Time: 3.25 pm   Total Visit Duration: 15 minutes

## 2023-07-25 ENCOUNTER — SOCIAL WORK (OUTPATIENT)
Dept: BEHAVIORAL/MENTAL HEALTH CLINIC | Facility: CLINIC | Age: 18
End: 2023-07-25
Payer: COMMERCIAL

## 2023-07-25 DIAGNOSIS — F41.9 ANXIETY DISORDER, UNSPECIFIED TYPE: Primary | ICD-10-CM

## 2023-07-25 PROCEDURE — 90837 PSYTX W PT 60 MINUTES: CPT

## 2023-07-25 NOTE — PSYCH
Behavioral Health Psychotherapy Progress Note    Psychotherapy Provided: Individual Psychotherapy     1. Anxiety disorder, unspecified type            Goals addressed in session: Goal 1     DATA: Ishaan talked with the counselor around the stressors with living with Adriana's mother. She talked about how her mother is in regards to her mental and physical health and overall attitude. She talked about different arguments that have happened since living there and what the future may look like. Ishaan talked about her sister abdulkadir and explained that she wants to gain guardianship over her as their father is not healthy enough to take care of abdulkadir. She explained stressors around abdulkadir moving in with her and explained that Adriana's mother is not open to abdulkadir living there. Ishaan talked about starting school soon and what changes she may have to make in regards to taking care of her sister. During this session, this clinician used the following therapeutic modalities: Cognitive Behavioral Therapy    Substance Abuse was not addressed during this session. If the client is diagnosed with a co-occurring substance use disorder, please indicate any changes in the frequency or amount of use: . Stage of change for addressing substance use diagnoses: No substance use/Not applicable    ASSESSMENT:  Candido Land presents with a Angry mood. her affect is Normal range and intensity, which is congruent, with her mood and the content of the session. The client has made progress on their goals. Ishaan was oriented and neatly dressed, her eye contact and speech were normal,  Candido Land presents with a none risk of suicide, none risk of self-harm, and none risk of harm to others. For any risk assessment that surpasses a "low" rating, a safety plan must be developed.     A safety plan was indicated: no  If yes, describe in detail     PLAN: Between sessions, Candido Land will continue with self care and positive self-talk along with decision making around her future. At the next session, the therapist will use Cognitive Behavioral Therapy to address daily stressors/ anger. Behavioral Health Treatment Plan and Discharge Planning: Reji Nixon is aware of and agrees to continue to work on their treatment plan. They have identified and are working toward their discharge goals.  yes    Visit start and stop times:    7/25/2023  Start Time: 1400  Stop Time: 1453  Total Visit Time: 53 minutes

## 2023-08-09 ENCOUNTER — SOCIAL WORK (OUTPATIENT)
Dept: BEHAVIORAL/MENTAL HEALTH CLINIC | Facility: CLINIC | Age: 18
End: 2023-08-09
Payer: COMMERCIAL

## 2023-08-09 DIAGNOSIS — F41.9 ANXIETY DISORDER, UNSPECIFIED TYPE: Primary | ICD-10-CM

## 2023-08-09 PROCEDURE — 90834 PSYTX W PT 45 MINUTES: CPT

## 2023-08-09 NOTE — PSYCH
Behavioral Health Psychotherapy Progress Note    Psychotherapy Provided: Individual Psychotherapy     1. Anxiety disorder, unspecified type            Goals addressed in session: Goal 1     DATA: Ishaan engaged with the counselor around how things have been in the home. She stated that she and Cherry Stephenson had another big argument with Doni Shepard who first told them to leave the home and then changed her mind. Ishaan talked about how it has been in the house and how uncomfortable she has been feeling. She talked about wanting to move, get a full time job, and pushing college back a year so that she can support her sister abdulkadir. Ishaan also explained that she has been having thoughts of self-harming however has not acted on them. She and the counselor talked about different coping skills or activities that she can do to release some of her anger that she feels daily. During this session, this clinician used the following therapeutic modalities: Cognitive Behavioral Therapy    Substance Abuse was not addressed during this session. If the client is diagnosed with a co-occurring substance use disorder, please indicate any changes in the frequency or amount of use: . Stage of change for addressing substance use diagnoses: No substance use/Not applicable    ASSESSMENT:  Sesar Polo presents with a Euthymic/ normal and Angry mood. her affect is Normal range and intensity, which is congruent, with her mood and the content of the session. The client has made progress on their goals. Ishaan was oriented and neatly dressed, her eye contact and speech were normal,  Sesar Polo presents with a none risk of suicide, low risk of self-harm, and none risk of harm to others. For any risk assessment that surpasses a "low" rating, a safety plan must be developed. A safety plan was indicated: yes  If yes, describe in detail coping skills are in place as well as people to contact as supports if needed.      PLAN: Between sessions, Heather Cornell will work on coping skills/ activities that help with anger and self-harm thoughts. At the next session, the therapist will use Cognitive Behavioral Therapy to address anger/ daily stressors. Behavioral Health Treatment Plan and Discharge Planning: Heather Cornell is aware of and agrees to continue to work on their treatment plan. They have identified and are working toward their discharge goals.  yes    Visit start and stop times:    08/09/23  Start Time: 1400  Stop Time: 1445  Total Visit Time: 45 minutes

## 2023-08-28 ENCOUNTER — TELEPHONE (OUTPATIENT)
Dept: BEHAVIORAL/MENTAL HEALTH CLINIC | Facility: CLINIC | Age: 18
End: 2023-08-28

## 2023-09-20 ENCOUNTER — SOCIAL WORK (OUTPATIENT)
Dept: BEHAVIORAL/MENTAL HEALTH CLINIC | Facility: CLINIC | Age: 18
End: 2023-09-20
Payer: COMMERCIAL

## 2023-09-20 DIAGNOSIS — F41.9 ANXIETY DISORDER, UNSPECIFIED TYPE: Primary | ICD-10-CM

## 2023-09-20 PROCEDURE — 90834 PSYTX W PT 45 MINUTES: CPT

## 2023-09-24 NOTE — PSYCH
Behavioral Health Psychotherapy Progress Note    Psychotherapy Provided: Individual Psychotherapy     1. Anxiety disorder, unspecified type            Goals addressed in session: Goal 1     DATA: Ishaan engaged with the counselor around how she has been. She talked about life at home and work. She explained that she got a second job and has been working non stop to help with saving for a new place to live since Glynn Gant is still unable to work due to her foot injury. She talked about how she has been frustrated lately with Glynn Gant in the kids. She stated that one of the kids birthday is coming up and explained that Glynn Gant has been spending a lot of money on this which upsets her. Ishaan explained that she feels that the kids are ungrateful and that Glynn Gant is trying to be the better parent than her ex  who often spoils the kids. Ishaan and the counselor talked about her feelings and the counselor helped her to process these emotions. During this session, this clinician used the following therapeutic modalities: Cognitive Behavioral Therapy    Substance Abuse was not addressed during this session. If the client is diagnosed with a co-occurring substance use disorder, please indicate any changes in the frequency or amount of use: . Stage of change for addressing substance use diagnoses: No substance use/Not applicable    ASSESSMENT:  Alejo Rm presents with a Euthymic/ normal mood. her affect is Normal range and intensity, which is congruent, with her mood and the content of the session. The client has made progress on their goals. Ishaan was oriented and neatly dressed, her eye contact and speech were normal.  Alejo Rm presents with a none risk of suicide, none risk of self-harm, and none risk of harm to others. For any risk assessment that surpasses a "low" rating, a safety plan must be developed.     A safety plan was indicated: no  If yes, describe in detail     PLAN: Between sessionsIshaan Yan Omar will continue with self-care and working on controlling her anger. At the next session, the therapist will use Cognitive Behavioral Therapy to address anxiety/anger. Behavioral Health Treatment Plan and Discharge Planning: Lisa Hung is aware of and agrees to continue to work on their treatment plan. They have identified and are working toward their discharge goals.  yes    Visit start and stop times:    9/20/2023  Start Time: 1700  Stop Time: 1750  Total Visit Time: 50 minutes

## 2023-09-25 ENCOUNTER — SOCIAL WORK (OUTPATIENT)
Dept: BEHAVIORAL/MENTAL HEALTH CLINIC | Facility: CLINIC | Age: 18
End: 2023-09-25
Payer: COMMERCIAL

## 2023-09-25 DIAGNOSIS — F41.9 ANXIETY DISORDER, UNSPECIFIED TYPE: Primary | ICD-10-CM

## 2023-09-25 PROCEDURE — 90834 PSYTX W PT 45 MINUTES: CPT

## 2023-09-25 NOTE — PSYCH
Behavioral Health Psychotherapy Progress Note    Psychotherapy Provided: Individual Psychotherapy     1. Anxiety disorder, unspecified type            Goals addressed in session: Goal 1     DATA: Ishaan engaged with the counselor around how she has been. She talked about her current stressors and how she has been dealing with her sleep. She asked about stress and how it changes the body in regards to energy and restlessness. Ishaan talked about her food intake and how she also feels this has been making her have lower energy levels. Ishaan then asked about self-care and what would be good things for her to do in regards to a night time routine to help her relax and sleep better. Ishaan and the counselor went over self-care tips and activities. During this session, this clinician used the following therapeutic modalities: Cognitive Behavioral Therapy    Substance Abuse was not addressed during this session. If the client is diagnosed with a co-occurring substance use disorder, please indicate any changes in the frequency or amount of use: . Stage of change for addressing substance use diagnoses: No substance use/Not applicable    ASSESSMENT:  Liz Carmen presents with a Euthymic/ normal mood. her affect is Normal range and intensity, which is congruent, with her mood and the content of the session. The client has made progress on their goals. Ishaan was oriented and neatly dressed, her eye contact and speech were normal.  Liz Carmen presents with a none risk of suicide, none risk of self-harm, and none risk of harm to others. For any risk assessment that surpasses a "low" rating, a safety plan must be developed. A safety plan was indicated: no  If yes, describe in detail     PLAN: Between sessions, Liz Carmen will continue with self-care and creating a bed time routine. At the next session, the therapist will use Cognitive Behavioral Therapy to address stress.     Behavioral Health Treatment Plan and Discharge Planning: Tremaine Dalton is aware of and agrees to continue to work on their treatment plan. They have identified and are working toward their discharge goals.  yes    Visit start and stop times:    09/25/23  Start Time: 1200  Stop Time: 1245  Total Visit Time: 45 minutes

## 2023-10-05 ENCOUNTER — SOCIAL WORK (OUTPATIENT)
Dept: BEHAVIORAL/MENTAL HEALTH CLINIC | Facility: CLINIC | Age: 18
End: 2023-10-05
Payer: COMMERCIAL

## 2023-10-05 ENCOUNTER — TELEMEDICINE (OUTPATIENT)
Dept: PSYCHIATRY | Facility: CLINIC | Age: 18
End: 2023-10-05
Payer: COMMERCIAL

## 2023-10-05 DIAGNOSIS — F32.A DEPRESSION, UNSPECIFIED DEPRESSION TYPE: Primary | ICD-10-CM

## 2023-10-05 DIAGNOSIS — F41.9 ANXIETY DISORDER, UNSPECIFIED TYPE: Primary | ICD-10-CM

## 2023-10-05 DIAGNOSIS — F41.1 GENERALIZED ANXIETY DISORDER: ICD-10-CM

## 2023-10-05 PROCEDURE — 90834 PSYTX W PT 45 MINUTES: CPT

## 2023-10-05 PROCEDURE — 99214 OFFICE O/P EST MOD 30 MIN: CPT | Performed by: PSYCHIATRY & NEUROLOGY

## 2023-10-05 RX ORDER — PROPRANOLOL HYDROCHLORIDE 10 MG/1
10 TABLET ORAL DAILY
Qty: 30 TABLET | Refills: 2 | Status: SHIPPED | OUTPATIENT
Start: 2023-10-05

## 2023-10-05 RX ORDER — LITHIUM CARBONATE 450 MG
450 TABLET, EXTENDED RELEASE ORAL DAILY
Qty: 30 TABLET | Refills: 2 | Status: SHIPPED | OUTPATIENT
Start: 2023-10-05

## 2023-10-05 RX ORDER — DULOXETIN HYDROCHLORIDE 60 MG/1
60 CAPSULE, DELAYED RELEASE ORAL DAILY
Qty: 30 CAPSULE | Refills: 2 | Status: SHIPPED | OUTPATIENT
Start: 2023-10-05

## 2023-10-05 NOTE — PSYCH
Virtual Regular Visit    Verification of patient location:    Patient is located at Other in the following state in which I hold an active license [place]      Assessment/Plan:    Problem List Items Addressed This Visit    None      Goals addressed in session: Goal 1          Reason for visit is   Chief Complaint   Patient presents with   • Medication Management        Encounter provider Joshua Piedra MD    Provider located at [address] [place] [address],[address]  [address]  [phone]      Recent Visits  No visits were found meeting these conditions. Showing recent visits within past 7 days and meeting all other requirements  Today's Visits  Date Type Provider Dept   10/05/23 Telemedicine Joshua Piedra, [address] today's visits and meeting all other requirements  Future Appointments  No visits were found meeting these conditions. Showing future appointments within next 150 days and meeting all other requirements       The patient was identified by name and date of birth. Lamberto Paez was informed that this is a telemedicine visit and that the visit is being conducted throughthe [email or web address]. She agrees to proceed. .  My office door was closed. No one else was in the room. She acknowledged consent and understanding of privacy and security of the video platform. The patient has agreed to participate and understands they can discontinue the visit at any time. Patient is aware this is a billable service. Subjective  Lamberto Paez is a 19 y.o. female with depression and anxiety presents for regular f/u   Compliant with meds, denies SE  Blood work done at [facility] - no results for review  Pt lives with a friend, works .   Denies acute medical problems      HPI   Mood - mild depression, but denies mood swings; good energy and functioning close to baseline  Anxiety - controlled; denies racing thoughts or panic attacks  Past Medical History:   Diagnosis Date   • Anxiety    • Asthma    • Depression    • Disruptive mood dysregulation disorder ([address])    • Psychiatric disorder    • Vasovagal near syncope        Past Surgical History:   Procedure Laterality Date   • WISDOM TOOTH EXTRACTION         Current Outpatient Medications   Medication Sig Dispense Refill   • albuterol (Ventolin HFA) 90 mcg/act inhaler Inhale 2 puffs every 4 (four) hours as needed for wheezing or shortness of breath 1 Inhaler 0   • DULoxetine (CYMBALTA) 60 mg delayed release capsule Take 1 capsule (60 mg total) by mouth daily 30 capsule 2   • Levonorgestrel (Liletta, 52 MG,) 19.5 MCG/DAY IUD IUD 1 each by Intrauterine route once     • lithium carbonate (LITHOBID) 450 mg CR tablet Take 1 tablet (450 mg total) by mouth daily 30 tablet 2   • propranolol (INDERAL) 10 mg tablet Take 1 tablet (10 mg total) by mouth daily 30 tablet 2     No current facility-administered medications for this visit. Allergies   Allergen Reactions   • Augmentin [Amoxicillin-Pot Clavulanate] Rash   • Penicillins Hives       Review of Systems   Constitutional: Negative for activity change and appetite change. Psychiatric/Behavioral: Negative for dysphoric mood, sleep disturbance and suicidal ideas. The patient is not nervous/anxious. Video Exam    There were no vitals filed for this visit. Physical Exam  Constitutional:       Appearance: Normal appearance. Neurological:      Mental Status: She is alert. Psychiatric:         Attention and Perception: Attention and perception normal.         Mood and Affect: Mood and affect normal.         Speech: Speech normal.         Behavior: Behavior normal. Behavior is cooperative. Thought Content:  Thought content normal.         Judgment: Judgment normal.          Visit Time    Visit Start Time: 2.58 pm   Visit Stop Time: 3.06 pm   Total Visit Duration: 17 minutes

## 2023-10-05 NOTE — PSYCH
Behavioral Health Psychotherapy Progress Note    Psychotherapy Provided: Individual Psychotherapy     1. Anxiety disorder, unspecified type            Goals addressed in session: Goal 1     DATA: Ishaan engaged with the counselor around how she has been. She talked about work and explained that she is very tired however it is due to working so many hours at both of her jobs. She talked about life at home and explained that nargis, the kids, and Nargis's mother have gone camping for the week so Ishaan will have the house to herself. She explained that other than work she plans to just relax for the weekend and catch up on sleep. The counselor talked with Ishaan about leaving the practice. Ishaan and the counselor talked about this and processed her feelings around the change. During this session, this clinician used the following therapeutic modalities: Cognitive Behavioral Therapy    Substance Abuse was not addressed during this session. If the client is diagnosed with a co-occurring substance use disorder, please indicate any changes in the frequency or amount of use: . Stage of change for addressing substance use diagnoses: No substance use/Not applicable    ASSESSMENT:  Chantal Rebollar presents with a Euthymic/ normal mood. her affect is Normal range and intensity, which is congruent, with her mood and the content of the session. The client has made progress on their goals. Ishaan was oriented and neatly dressed, her eye contact and speech were normal.  Chantal Rebollar presents with a none risk of suicide, none risk of self-harm, and none risk of harm to others. For any risk assessment that surpasses a "low" rating, a safety plan must be developed. A safety plan was indicated: no  If yes, describe in detail     PLAN: Between sessions, Chantal Rebollar will continue with coping skills. At the next session, the therapist will use Cognitive Behavioral Therapy to address anxiety/ stress.     Behavioral Health Treatment Plan and Discharge Planning: Dawit Rosas is aware of and agrees to continue to work on their treatment plan. They have identified and are working toward their discharge goals.  yes    Visit start and stop times:    10/05/23  Start Time: 1600  Stop Time: 1645  Total Visit Time: 45 minutes

## 2023-10-06 ENCOUNTER — TELEPHONE (OUTPATIENT)
Dept: PSYCHIATRY | Facility: CLINIC | Age: 18
End: 2023-10-06

## 2023-10-06 NOTE — TELEPHONE ENCOUNTER
Called and left message for client to inquire where she went for bloodwork. Dr. Edward Sullivan requesting results through [address] however none are on file.

## 2023-10-24 ENCOUNTER — DOCUMENTATION (OUTPATIENT)
Dept: BEHAVIORAL/MENTAL HEALTH CLINIC | Facility: CLINIC | Age: 18
End: 2023-10-24

## 2023-10-24 NOTE — PROGRESS NOTES
[address] [place] [address]    Patient Name Dawit Rosas     Date of Birth: 19 y.o. 2005      MRN: [id number]    Admission Date:  10/18/2021    Date of Transfer: October 24, 2023    Admission Diagnosis:     Unspecified Depressive Disorder  Biopolar Disorder, type I  PTSD  Anxiety Disorder  Borderline Personality Disorder    Current Diagnosis:     No diagnosis found. Reason for Admission: Ishaan presented for treatment due to depression, depressive symptoms, anxiety, anxiety symptoms, and PTSD symptoms. Primary complaints included DEPRESSIVE SYMPTOMS: depressed mood, sadness, hopelessness, low energy, low motivation, decreased interest, poor concentration, ANXIETY SYMPTOMS: daily anxiety symptoms, feeling nervous, worrying daily, poor concentration, feeling agitated, racing thoughts, PTSD symptoms (detachment, dissociation, emotional numbness, flashbacks, negative beliefs), and BIPOLAR DISORDER SYMPTOMS: increased irritability, irritability, mood swings, racing thoughts. Progress in Treatment: Ishaan was seen for Psychiatric Evaluation, Medication Management, and Individual Couseling. During the course of treatment she .    Episodes of Higher Level of Care: No    Transfer request Initiated by: Psychiatrist: Hesham Therapist:  Ney Perdomo    Reason for Transfer Request: clinician leaving practice    Does this individual need a clinician with specialized training/expertise?: No    Is this client working with any other [facility] Providers/Therapists?  Psychiatrist: Julia Driver Therapist: None    Other pertinent issues: None    Are there any specific individuals who would be a “best fit” or who have already agreed to accept this transfer request?      Psychiatrist: None   Therapist: None  Rationale: Patient prefers female clinician    Attempts to maintain the current therapeutic relationship: No    Transfer request routed to Clinical Coordinator for input and/or approval.     Comments from other involved providers and/or clinical coordinator : None    Vern King

## 2023-10-31 ENCOUNTER — SOCIAL WORK (OUTPATIENT)
Dept: BEHAVIORAL/MENTAL HEALTH CLINIC | Facility: CLINIC | Age: 18
End: 2023-10-31
Payer: COMMERCIAL

## 2023-10-31 DIAGNOSIS — F41.9 ANXIETY DISORDER, UNSPECIFIED TYPE: Primary | ICD-10-CM

## 2023-10-31 PROCEDURE — 90834 PSYTX W PT 45 MINUTES: CPT

## 2023-10-31 NOTE — PSYCH
Behavioral Health Psychotherapy Progress Note    Psychotherapy Provided: Individual Psychotherapy     1. Anxiety disorder, unspecified type            Goals addressed in session: Goal 1     DATA: Ishaan engaged with the counselor around how she has been. She talked about her health and explained that she has been sick for a few days however she feels like she is getting better. She talked about life at home and explained Vacne Samaniego has been recovering from her foot injury and will be able to go back to work soon. Ishaan explained that she has been thinking about her mother a lot recently and feels it is because of the upcoming holidays. Ishaan explained that she is ready to start EMDR therapy to start to dive deeper into her trauma. During this session, this clinician used the following therapeutic modalities: Cognitive Behavioral Therapy    Substance Abuse was not addressed during this session. If the client is diagnosed with a co-occurring substance use disorder, please indicate any changes in the frequency or amount of use: . Stage of change for addressing substance use diagnoses: No substance use/Not applicable    ASSESSMENT:  Meagan Michaels presents with a Euthymic/ normal mood. her affect is Normal range and intensity, which is congruent, with her mood and the content of the session. The client has made progress on their goals. Ishaan was oriented and neatly dressed, her eye contact and speech were normal.  Meagan Michaels presents with a none risk of suicide, none risk of self-harm, and none risk of harm to others. For any risk assessment that surpasses a "low" rating, a safety plan must be developed. A safety plan was indicated: no  If yes, describe in detail     PLAN: Between sessions, Meagan Michaels will continue with self-care and healthy communication. Behavioral Health Treatment Plan and Discharge Planning: Meagan Michaels is aware of and agrees to continue to work on their treatment plan. They have identified and are working toward their discharge goals.  yes    Visit start and stop times:    10/31/23  Start Time: 1600  Stop Time: 1645  Total Visit Time: 45 minutes

## 2023-11-03 ENCOUNTER — TELEPHONE (OUTPATIENT)
Dept: PSYCHIATRY | Facility: CLINIC | Age: 18
End: 2023-11-03

## 2023-11-03 NOTE — TELEPHONE ENCOUNTER
Called pt in regards to transfer of care from [facility] Pt is looking for EMDR therapy. Lynne has some availability. Writer wanted to discuss options with pt.

## 2023-12-14 ENCOUNTER — TELEPHONE (OUTPATIENT)
Dept: PSYCHIATRY | Facility: CLINIC | Age: 18
End: 2023-12-14

## 2023-12-14 NOTE — TELEPHONE ENCOUNTER
LVM about MA terming 10/31/2023. Informed patient to call back on the status of getting that renewed or if they would like to self-pay for this appointment.

## 2023-12-21 ENCOUNTER — TELEPHONE (OUTPATIENT)
Dept: PSYCHIATRY | Facility: CLINIC | Age: 18
End: 2023-12-21

## 2023-12-21 NOTE — TELEPHONE ENCOUNTER
Called and left message for client regarding appt. today at 3:00 pm with Dr. Osorio.    MA terminated 10/31.  Need new insurance information or self pay documentation signed prior to appt.

## 2024-01-29 ENCOUNTER — TELEMEDICINE (OUTPATIENT)
Dept: PSYCHIATRY | Facility: CLINIC | Age: 19
End: 2024-01-29

## 2024-01-29 DIAGNOSIS — F32.A DEPRESSION, UNSPECIFIED DEPRESSION TYPE: ICD-10-CM

## 2024-01-29 DIAGNOSIS — F41.1 GENERALIZED ANXIETY DISORDER: ICD-10-CM

## 2024-01-29 RX ORDER — LITHIUM CARBONATE 450 MG
450 TABLET, EXTENDED RELEASE ORAL DAILY
Qty: 30 TABLET | Refills: 1 | Status: SHIPPED | OUTPATIENT
Start: 2024-01-29

## 2024-01-29 RX ORDER — PROPRANOLOL HYDROCHLORIDE 10 MG/1
10 TABLET ORAL DAILY
Qty: 30 TABLET | Refills: 1 | Status: SHIPPED | OUTPATIENT
Start: 2024-01-29

## 2024-01-29 RX ORDER — DULOXETIN HYDROCHLORIDE 60 MG/1
60 CAPSULE, DELAYED RELEASE ORAL DAILY
Qty: 30 CAPSULE | Refills: 1 | Status: SHIPPED | OUTPATIENT
Start: 2024-01-29

## 2024-01-29 RX ORDER — DULOXETIN HYDROCHLORIDE 30 MG/1
30 CAPSULE, DELAYED RELEASE ORAL DAILY
Qty: 30 CAPSULE | Refills: 1 | Status: SHIPPED | OUTPATIENT
Start: 2024-01-29

## 2024-01-29 NOTE — PSYCH
"  Virtual Regular Visit    Verification of patient location:    Patient is located at Home in the following state in which I hold an active license [place]      Assessment/Plan:    Problem List Items Addressed This Visit    None      Goals addressed in session: Goal 1          Reason for visit is   Chief Complaint   Patient presents with    Medication Management        Encounter provider Michelle Osorio MD    Provider located at [facility] MENTAL HEALTH OUTPATIENT  [address] [facility] [id number] [phone]      Recent Visits  No visits were found meeting these conditions.  Showing recent visits within past 7 days and meeting all other requirements  Today's Visits  Date Type Provider Dept   01/29/24 Telemedicine Michelle Osorio MD [facility]   Showing today's visits and meeting all other requirements  Future Appointments  No visits were found meeting these conditions.  Showing future appointments within next 150 days and meeting all other requirements       The patient was identified by name and date of birth. Avril Bethea was informed that this is a telemedicine visit and that the visit is being conducted throughthe Epic Embedded platform. She agrees to proceed..  My office door was closed. No one else was in the room.  She acknowledged consent and understanding of privacy and security of the video platform. The patient has agreed to participate and understands they can discontinue the visit at any time.    Patient is aware this is a billable service.     Subjective  Avril Bethea is a 18 y.o. female with depression and anxiety presents for regular f/u  .  Compliant with meds, denies SE  New job - detailing tour buses  Lives with a friend  Denies acute medical problems  As per pt, blood work done several months ago - no results are available to review  HPI   Mood - increased mood swings lately - 1-2 x week, for 1- 3 hrs - \" feeling super happy and increased " "energy, then suddenly become upset and angry\"; then \" tired, frustrated, guilty\". Denies aggressive behavior. Pt uses journaling as coping skills  Urges to cut ( denies SIB); episodes of low self esteem. Pt does ADLs, active and social -close to baseline  Anxiety - controlled with propranolol prn; denies panic attacks   Past Medical History:   Diagnosis Date    Anxiety     Asthma     Depression     Disruptive mood dysregulation disorder (HCC)     Psychiatric disorder     Vasovagal near syncope        Past Surgical History:   Procedure Laterality Date    WISDOM TOOTH EXTRACTION         Current Outpatient Medications   Medication Sig Dispense Refill    DULoxetine (CYMBALTA) 60 mg delayed release capsule Take 1 capsule (60 mg total) by mouth daily 30 capsule 2    lithium carbonate (LITHOBID) 450 mg CR tablet Take 1 tablet (450 mg total) by mouth daily 30 tablet 2    propranolol (INDERAL) 10 mg tablet Take 1 tablet (10 mg total) by mouth daily 30 tablet 2    albuterol (Ventolin HFA) 90 mcg/act inhaler Inhale 2 puffs every 4 (four) hours as needed for wheezing or shortness of breath 1 Inhaler 0    Levonorgestrel (Liletta, 52 MG,) 19.5 MCG/DAY IUD IUD 1 each by Intrauterine route once       No current facility-administered medications for this visit.        Allergies   Allergen Reactions    Augmentin [Amoxicillin-Pot Clavulanate] Rash    Penicillins Hives       Review of Systems   Constitutional:  Negative for activity change and appetite change.   Psychiatric/Behavioral:  Positive for dysphoric mood. Negative for sleep disturbance and suicidal ideas. The patient is not nervous/anxious.        Video Exam    There were no vitals filed for this visit.    Physical Exam  Constitutional:       Appearance: Normal appearance.   Neurological:      Mental Status: She is alert.   Psychiatric:         Attention and Perception: Attention normal.         Mood and Affect: Mood is depressed. Affect is blunt.         Speech: Speech " normal.         Behavior: Behavior normal. Behavior is cooperative.         Thought Content: Thought content normal.         Judgment: Judgment normal.          Visit Time    Visit Start Time: 7.58 am   Visit Stop Time: 8.08 am   Total Visit Duration:  25 minutes

## 2024-03-18 ENCOUNTER — TELEMEDICINE (OUTPATIENT)
Dept: PSYCHIATRY | Facility: CLINIC | Age: 19
End: 2024-03-18

## 2024-03-18 DIAGNOSIS — F32.A DEPRESSION, UNSPECIFIED DEPRESSION TYPE: ICD-10-CM

## 2024-03-18 DIAGNOSIS — F41.1 GENERALIZED ANXIETY DISORDER: ICD-10-CM

## 2024-03-18 PROCEDURE — 99214 OFFICE O/P EST MOD 30 MIN: CPT | Performed by: PSYCHIATRY & NEUROLOGY

## 2024-03-18 RX ORDER — DULOXETIN HYDROCHLORIDE 60 MG/1
60 CAPSULE, DELAYED RELEASE ORAL DAILY
Qty: 30 CAPSULE | Refills: 1 | Status: SHIPPED | OUTPATIENT
Start: 2024-03-18

## 2024-03-18 RX ORDER — LITHIUM CARBONATE 450 MG
450 TABLET, EXTENDED RELEASE ORAL DAILY
Qty: 30 TABLET | Refills: 1 | Status: SHIPPED | OUTPATIENT
Start: 2024-03-18

## 2024-03-18 RX ORDER — DULOXETIN HYDROCHLORIDE 30 MG/1
30 CAPSULE, DELAYED RELEASE ORAL DAILY
Qty: 30 CAPSULE | Refills: 1 | Status: SHIPPED | OUTPATIENT
Start: 2024-03-18

## 2024-03-18 RX ORDER — PROPRANOLOL HYDROCHLORIDE 10 MG/1
10 TABLET ORAL DAILY
Qty: 30 TABLET | Refills: 1 | Status: SHIPPED | OUTPATIENT
Start: 2024-03-18

## 2024-03-18 NOTE — PSYCH
Virtual Regular Visit    Verification of patient location:    Patient is located at Other in the following state in which I hold an active license [place]      Assessment/Plan:    Problem List Items Addressed This Visit    None      Goals addressed in session: Goal 1          Reason for visit is   Chief Complaint   Patient presents with    Medication Management        Encounter provider Michelle Osorio MD    Provider located at [facility] MENTAL HEALTH OUTPATIENT  [address] [facility] [id number] [phone]      Recent Visits  No visits were found meeting these conditions.  Showing recent visits within past 7 days and meeting all other requirements  Today's Visits  Date Type Provider Dept   03/18/24 Telemedicine Michelle Osorio MD [facility]   Showing today's visits and meeting all other requirements  Future Appointments  No visits were found meeting these conditions.  Showing future appointments within next 150 days and meeting all other requirements       The patient was identified by name and date of birth. Avril Bethea was informed that this is a telemedicine visit and that the visit is being conducted throughthe Epic Embedded platform. She agrees to proceed..  My office door was closed. No one else was in the room.  She acknowledged consent and understanding of privacy and security of the video platform. The patient has agreed to participate and understands they can discontinue the visit at any time.    Patient is aware this is a billable service.     Subjective  Avril Bethea is a 18 y.o. female with depression and anxiety presents for regular f/u  .  Pharmacy didin`t refill cymbalta 30 mg ; pt ran out 2 weeks ago ( continued 60 mg )  Pt continued other meds, denies SE  Denies acute medical problems  Pt works night shifts at buses detailing    HPI   Mood - improved on cymbalta 90 mg - less mood swings, better energy and motivation  For 2 weeks on 60 mg -  reports episodes of feeling sad, less ADLs, emotionally unstable  Anxiety - controlled; denies panic attacks or racing thoughts  Past Medical History:   Diagnosis Date    Anxiety     Asthma     Depression     Disruptive mood dysregulation disorder (HCC)     Psychiatric disorder     Vasovagal near syncope        Past Surgical History:   Procedure Laterality Date    WISDOM TOOTH EXTRACTION         Current Outpatient Medications   Medication Sig Dispense Refill    DULoxetine (Cymbalta) 30 mg delayed release capsule Take 1 capsule (30 mg total) by mouth daily With 60 mg 30 capsule 1    lithium carbonate (LITHOBID) 450 mg CR tablet Take 1 tablet (450 mg total) by mouth daily 30 tablet 1    propranolol (INDERAL) 10 mg tablet Take 1 tablet (10 mg total) by mouth daily 30 tablet 1    albuterol (Ventolin HFA) 90 mcg/act inhaler Inhale 2 puffs every 4 (four) hours as needed for wheezing or shortness of breath 1 Inhaler 0    DULoxetine (CYMBALTA) 60 mg delayed release capsule Take 1 capsule (60 mg total) by mouth daily 30 capsule 1    Levonorgestrel (Liletta, 52 MG,) 19.5 MCG/DAY IUD IUD 1 each by Intrauterine route once       No current facility-administered medications for this visit.        Allergies   Allergen Reactions    Augmentin [Amoxicillin-Pot Clavulanate] Rash    Penicillins Hives       Review of Systems   Constitutional:  Negative for activity change and appetite change.   Psychiatric/Behavioral:  Positive for dysphoric mood. Negative for sleep disturbance and suicidal ideas. The patient is not nervous/anxious.        Video Exam    There were no vitals filed for this visit.    Physical Exam  Constitutional:       Appearance: Normal appearance.   Neurological:      Mental Status: She is alert.   Psychiatric:         Attention and Perception: Attention and perception normal.         Mood and Affect: Mood is depressed. Affect is blunt.         Speech: Speech normal.         Behavior: Behavior normal. Behavior is  cooperative.         Thought Content: Thought content normal.         Judgment: Judgment normal.          Visit Time    Visit Start Time: 7.58 am   Visit Stop Time: 8.06 am   Total Visit Duration:  20 minutes  TREATMENT PLAN (Medication Management Only)        [facility]    Name and Date of Birth:  Avril Bethea 18 y.o. 2005  Date of Treatment Plan: March 18, 2024  Diagnosis/Diagnoses:  No diagnosis found.  Strengths/Personal Resources for Self-Care: taking medications as prescribed, motivation for treatment.  Area/Areas of need (in own words): anxiety, depression  1. Long Term Goal: continue improvement in depression.  Target Date:6 months - 9/18/2024  Person/Persons responsible for completion of goal: Avril  2.  Short Term Objective (s) - How will we reach this goal?:   A. Provider new recommended medication/dosage changes and/or continue medication(s): continue current medications as prescribed Cymbalta, Lithium, Propranolol.  B. N/A.  C. N/A.  Target Date:6 months - 9/18/2024  Person/Persons Responsible for Completion of Goal: Avril  Progress Towards Goals: continuing treatment  Treatment Modality: medication management every 2 months  Review due 180 days from date of this plan: 6 months - 9/18/2024  Expected length of service: ongoing treatment  My Physician/PA/NP and I have developed this plan together and I agree to work on the goals and objectives. I understand the treatment goals that were developed for my treatment.

## 2024-05-16 DIAGNOSIS — F32.A DEPRESSION, UNSPECIFIED DEPRESSION TYPE: ICD-10-CM

## 2024-05-16 DIAGNOSIS — F41.1 GENERALIZED ANXIETY DISORDER: ICD-10-CM

## 2024-05-16 RX ORDER — LITHIUM CARBONATE 450 MG
450 TABLET, EXTENDED RELEASE ORAL DAILY
Qty: 30 TABLET | Refills: 1 | Status: SHIPPED | OUTPATIENT
Start: 2024-05-16

## 2024-05-16 RX ORDER — DULOXETIN HYDROCHLORIDE 60 MG/1
60 CAPSULE, DELAYED RELEASE ORAL DAILY
Qty: 30 CAPSULE | Refills: 1 | Status: SHIPPED | OUTPATIENT
Start: 2024-05-16

## 2024-05-16 RX ORDER — PROPRANOLOL HYDROCHLORIDE 10 MG/1
10 TABLET ORAL DAILY
Qty: 30 TABLET | Refills: 1 | Status: SHIPPED | OUTPATIENT
Start: 2024-05-16

## 2024-05-16 RX ORDER — DULOXETIN HYDROCHLORIDE 30 MG/1
30 CAPSULE, DELAYED RELEASE ORAL DAILY
Qty: 30 CAPSULE | Refills: 1 | Status: SHIPPED | OUTPATIENT
Start: 2024-05-16

## 2024-05-16 NOTE — TELEPHONE ENCOUNTER
Pt called, due to missing yesterdays appt she will now need refills on all of her medications before her next appt

## 2024-06-26 ENCOUNTER — TELEPHONE (OUTPATIENT)
Dept: PSYCHIATRY | Facility: CLINIC | Age: 19
End: 2024-06-26

## 2024-06-28 ENCOUNTER — TELEPHONE (OUTPATIENT)
Dept: PSYCHIATRY | Facility: CLINIC | Age: 19
End: 2024-06-28

## 2024-07-03 ENCOUNTER — TELEMEDICINE (OUTPATIENT)
Dept: PSYCHIATRY | Facility: CLINIC | Age: 19
End: 2024-07-03
Payer: COMMERCIAL

## 2024-07-03 DIAGNOSIS — F41.1 GENERALIZED ANXIETY DISORDER: ICD-10-CM

## 2024-07-03 DIAGNOSIS — F32.A DEPRESSION, UNSPECIFIED DEPRESSION TYPE: ICD-10-CM

## 2024-07-03 PROCEDURE — 99214 OFFICE O/P EST MOD 30 MIN: CPT | Performed by: PSYCHIATRY & NEUROLOGY

## 2024-07-03 RX ORDER — DULOXETIN HYDROCHLORIDE 30 MG/1
30 CAPSULE, DELAYED RELEASE ORAL DAILY
Qty: 30 CAPSULE | Refills: 1 | Status: SHIPPED | OUTPATIENT
Start: 2024-07-03

## 2024-07-03 RX ORDER — LITHIUM CARBONATE 450 MG
450 TABLET, EXTENDED RELEASE ORAL DAILY
Qty: 30 TABLET | Refills: 1 | Status: SHIPPED | OUTPATIENT
Start: 2024-07-03

## 2024-07-03 RX ORDER — PROPRANOLOL HYDROCHLORIDE 10 MG/1
10 TABLET ORAL DAILY
Qty: 30 TABLET | Refills: 1 | Status: SHIPPED | OUTPATIENT
Start: 2024-07-03

## 2024-07-03 RX ORDER — DULOXETIN HYDROCHLORIDE 60 MG/1
60 CAPSULE, DELAYED RELEASE ORAL DAILY
Qty: 30 CAPSULE | Refills: 1 | Status: SHIPPED | OUTPATIENT
Start: 2024-07-03

## 2024-07-03 NOTE — PSYCH
Virtual Regular Visit    Verification of patient location:    Patient is located at Home in the following state in which I hold an active license [place]      Assessment/Plan:    Problem List Items Addressed This Visit    None      Goals addressed in session: Goal 1          Reason for visit is   Chief Complaint   Patient presents with    Medication Management        Encounter provider Michelle Osorio MD      Recent Visits  Date Type Provider Dept   06/28/24 Telephone [facility]   06/26/24 Telephone [facility]   Showing recent visits within past 7 days and meeting all other requirements  Today's Visits  Date Type Provider Dept   07/03/24 Telemedicine Michelle Osorio MD [facility]   Showing today's visits and meeting all other requirements  Future Appointments  No visits were found meeting these conditions.  Showing future appointments within next 150 days and meeting all other requirements       The patient was identified by name and date of birth. Avril Bethea was informed that this is a telemedicine visit and that the visit is being conducted throughthe Epic Embedded platform. She agrees to proceed..  My office door was closed. No one else was in the room.  She acknowledged consent and understanding of privacy and security of the video platform. The patient has agreed to participate and understands they can discontinue the visit at any time.    Patient is aware this is a billable service.     Subjective  Avril Bethea is a 19 y.o. female with depression and anxiety presents for regular f/u  .  Compliant with meds, denies SE  No recent check up or blood work  Pt works night shift, buses detailing; lives with friend`s family      HPI   Mood - reports as mostly good and stable; sometimes gets stressed, but does ADLs, stays social and active; denies depression  Anxiety - controlled; denies panic attacks or racing thoughts  Past Medical History:    Diagnosis Date    Anxiety     Asthma     Depression     Disruptive mood dysregulation disorder (HCC)     Psychiatric disorder     Vasovagal near syncope        Past Surgical History:   Procedure Laterality Date    WISDOM TOOTH EXTRACTION         Current Outpatient Medications   Medication Sig Dispense Refill    DULoxetine (Cymbalta) 30 mg delayed release capsule Take 1 capsule (30 mg total) by mouth daily With 60 mg 30 capsule 1    DULoxetine (CYMBALTA) 60 mg delayed release capsule Take 1 capsule (60 mg total) by mouth daily 30 capsule 1    Levonorgestrel (Liletta, 52 MG,) 19.5 MCG/DAY IUD IUD 1 each by Intrauterine route once      lithium carbonate (LITHOBID) 450 mg CR tablet Take 1 tablet (450 mg total) by mouth daily 30 tablet 1    propranolol (INDERAL) 10 mg tablet Take 1 tablet (10 mg total) by mouth daily 30 tablet 1    albuterol (Ventolin HFA) 90 mcg/act inhaler Inhale 2 puffs every 4 (four) hours as needed for wheezing or shortness of breath 1 Inhaler 0     No current facility-administered medications for this visit.        Allergies   Allergen Reactions    Augmentin [Amoxicillin-Pot Clavulanate] Rash    Penicillins Hives       Review of Systems   Constitutional:  Negative for activity change and appetite change.   Psychiatric/Behavioral:  Negative for dysphoric mood, sleep disturbance and suicidal ideas. The patient is not nervous/anxious.        Video Exam    There were no vitals filed for this visit.    Physical Exam  Constitutional:       Appearance: Normal appearance. She is normal weight.   Neurological:      Mental Status: She is alert.   Psychiatric:         Attention and Perception: Attention and perception normal.         Mood and Affect: Mood normal. Affect is blunt.         Speech: Speech normal.         Behavior: Behavior normal. Behavior is cooperative.         Thought Content: Thought content normal.         Judgment: Judgment normal.          Visit Time    Visit Start Time: 7.57 am   Visit  Stop Time: 8.07 am   Total Visit Duration:  20 minutes

## 2024-08-27 ENCOUNTER — TELEMEDICINE (OUTPATIENT)
Dept: PSYCHIATRY | Facility: CLINIC | Age: 19
End: 2024-08-27
Payer: COMMERCIAL

## 2024-08-27 ENCOUNTER — TELEPHONE (OUTPATIENT)
Age: 19
End: 2024-08-27

## 2024-08-27 DIAGNOSIS — F41.1 GENERALIZED ANXIETY DISORDER: ICD-10-CM

## 2024-08-27 DIAGNOSIS — F32.A DEPRESSION, UNSPECIFIED DEPRESSION TYPE: Primary | ICD-10-CM

## 2024-08-27 DIAGNOSIS — F60.3 BORDERLINE PERSONALITY DISORDER (HCC): ICD-10-CM

## 2024-08-27 PROCEDURE — 99214 OFFICE O/P EST MOD 30 MIN: CPT | Performed by: PSYCHIATRY & NEUROLOGY

## 2024-08-27 RX ORDER — DULOXETIN HYDROCHLORIDE 30 MG/1
30 CAPSULE, DELAYED RELEASE ORAL DAILY
Qty: 30 CAPSULE | Refills: 2 | Status: SHIPPED | OUTPATIENT
Start: 2024-08-27

## 2024-08-27 RX ORDER — DULOXETIN HYDROCHLORIDE 60 MG/1
60 CAPSULE, DELAYED RELEASE ORAL DAILY
Qty: 30 CAPSULE | Refills: 2 | Status: SHIPPED | OUTPATIENT
Start: 2024-08-27

## 2024-08-27 RX ORDER — LITHIUM CARBONATE 450 MG
450 TABLET, EXTENDED RELEASE ORAL DAILY
Qty: 30 TABLET | Refills: 2 | Status: SHIPPED | OUTPATIENT
Start: 2024-08-27

## 2024-08-27 RX ORDER — PROPRANOLOL HYDROCHLORIDE 10 MG/1
10 TABLET ORAL DAILY
Qty: 30 TABLET | Refills: 2 | Status: SHIPPED | OUTPATIENT
Start: 2024-08-27

## 2024-08-27 NOTE — PSYCH
"  Virtual Regular Visit    Verification of patient location:    Patient is located at Home in the following state in which I hold an active license [place]      Assessment/Plan:    Problem List Items Addressed This Visit    None      Goals addressed in session: Goal 1          Reason for visit is   Chief Complaint   Patient presents with    Medication Management        Encounter provider Michelle Osorio MD      Recent Visits  No visits were found meeting these conditions.  Showing recent visits within past 7 days and meeting all other requirements  Today's Visits  Date Type Provider Dept   08/27/24 Telemedicine Michelle Osorio MD [facility]   Showing today's visits and meeting all other requirements  Future Appointments  No visits were found meeting these conditions.  Showing future appointments within next 150 days and meeting all other requirements       The patient was identified by name and date of birth. Avril Bethea was informed that this is a telemedicine visit and that the visit is being conducted throughthe Epic Embedded platform. She agrees to proceed..  My office door was closed. No one else was in the room.  She acknowledged consent and understanding of privacy and security of the video platform. The patient has agreed to participate and understands they can discontinue the visit at any time.    Patient is aware this is a billable service.     Subjective  Avril Bethea is a 19 y.o. adult with depression and anxiety presents for regular f/u  .  Compliant with meds, denies SE  Blood work scheduled for 09/04/24  Pt denies acute medical problems or other stressors    HPI   Mood - mostly stable; 1 x week gets angry with yelling and urges to punch ; sometimes urges to SIB  ( short episodes, 15-20 min); followed by feeling tried and depressed for 1-2 hrs. Pt denies manic spx  Reports that anger \" builds up\" gradually and then  she gets outburst.  Anxiety - controlled; denies panic attacks or racing " thoughts  Sleep - good, 8-9 hrs  Past Medical History:   Diagnosis Date    Anxiety     Asthma     Depression     Disruptive mood dysregulation disorder (HCC)     Psychiatric disorder     Vasovagal near syncope        Past Surgical History:   Procedure Laterality Date    WISDOM TOOTH EXTRACTION         Current Outpatient Medications   Medication Sig Dispense Refill    albuterol (Ventolin HFA) 90 mcg/act inhaler Inhale 2 puffs every 4 (four) hours as needed for wheezing or shortness of breath 1 Inhaler 0    DULoxetine (Cymbalta) 30 mg delayed release capsule Take 1 capsule (30 mg total) by mouth daily With 60 mg 30 capsule 1    DULoxetine (CYMBALTA) 60 mg delayed release capsule Take 1 capsule (60 mg total) by mouth daily 30 capsule 1    Levonorgestrel (Liletta, 52 MG,) 19.5 MCG/DAY IUD IUD 1 each by Intrauterine route once      lithium carbonate (LITHOBID) 450 mg CR tablet Take 1 tablet (450 mg total) by mouth daily 30 tablet 1    propranolol (INDERAL) 10 mg tablet Take 1 tablet (10 mg total) by mouth daily 30 tablet 1     No current facility-administered medications for this visit.        Allergies   Allergen Reactions    Augmentin [Amoxicillin-Pot Clavulanate] Rash    Penicillins Hives       Review of Systems   Constitutional:  Negative for activity change and appetite change.   Psychiatric/Behavioral:  Negative for dysphoric mood, sleep disturbance and suicidal ideas. The patient is not nervous/anxious.        Video Exam    There were no vitals filed for this visit.    Physical Exam  Constitutional:       Appearance: Normal appearance. She is normal weight.   Neurological:      Mental Status: She is alert.   Psychiatric:         Attention and Perception: Attention and perception normal.         Mood and Affect: Mood normal. Mood is not anxious or depressed. Affect is blunt.         Speech: Speech normal.         Behavior: Behavior normal. Behavior is cooperative.         Thought Content: Thought content normal.          Judgment: Judgment normal.          Visit Time    Visit Start Time: 9.27 am   Visit Stop Time: 9.37 am   Total Visit Duration:  20 minutes  TREATMENT PLAN (Medication Management Only)        [facility]    Name and Date of Birth:  Avril Bethea 19 y.o. 2005  Date of Treatment Plan: August 27, 2024  Diagnosis/Diagnoses:    1. Depression, unspecified depression type    2. Generalized anxiety disorder    3. Borderline personality disorder (HCC)      Strengths/Personal Resources for Self-Care: taking medications as prescribed, motivation for treatment.  Area/Areas of need (in own words): depression, mood swings, anger control  1. Long Term Goal: improve anger control.  Target Date:6 months - 2/27/2025  Person/Persons responsible for completion of goal: Avril  2.  Short Term Objective (s) - How will we reach this goal?:   A. Provider new recommended medication/dosage changes and/or continue medication(s): continue current medications as prescribed Cymbalta, Lithium, Propranolol.  B. N/A.  C. N/A.  Target Date:6 months - 2/27/2025  Person/Persons Responsible for Completion of Goal: Avril  Progress Towards Goals: continuing treatment  Treatment Modality: medication management every 3 months  Review due 180 days from date of this plan: 6 months - 2/27/2025  Expected length of service: ongoing treatment  My Physician/PA/NP and I have developed this plan together and I agree to work on the goals and objectives. I understand the treatment goals that were developed for my treatment.

## 2024-08-27 NOTE — TELEPHONE ENCOUNTER
[facility]  Client is being referred for therapy per Dr. Michelle Osorio.  She has [employer] insurance.    Thanks!    The patient placed on the TT wait list per provider request.

## 2024-10-03 ENCOUNTER — DOCUMENTATION (OUTPATIENT)
Dept: PSYCHIATRY | Facility: CLINIC | Age: 19
End: 2024-10-03

## 2024-10-03 DIAGNOSIS — F32.A DEPRESSION, UNSPECIFIED DEPRESSION TYPE: Primary | ICD-10-CM

## 2024-10-10 ENCOUNTER — TELEPHONE (OUTPATIENT)
Dept: PSYCHIATRY | Facility: CLINIC | Age: 19
End: 2024-10-10

## 2024-10-10 NOTE — TELEPHONE ENCOUNTER
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 Writer checked and it is not ready at the .     Dr. Osorio works remotely, donar checked with Dr. Osorio's , and all forms she fills out are mailed and mailed back to the office. Client does have ROMA for us to fax as soon as it arrives back.     Writer reached out to Dr. Osorio, post call Dr. Osorio replied.    Writer called client back.    Dr. Osorio has not received the forms to fill out as of 10/10/24, but believes they have been sent out to her last week.There is a normal 7 day turn around with letters; however, having an indirect route to a remote provider adds difficulty and time at the mercy of the mail.    Client inquired if bringing in the forms again would make it quicker, writer is not sure if they can just be sent out or if they have to be completely processed by medical records.     As they were sent out last week, writer told client he does not believe that means that something like them getting lost happened in the mail, but that it's just taking time, but it might not hurt to try again just in case.     Writer sent a message to medical records inquiring if she brings in copies just in case something happened in the mail, if they would be able to be sent out right away to Dr. Osorio and told client he would reach back out if/when there is any follow-up news.

## 2024-10-11 ENCOUNTER — TELEPHONE (OUTPATIENT)
Dept: PSYCHIATRY | Facility: CLINIC | Age: 19
End: 2024-10-11

## 2024-10-11 NOTE — TELEPHONE ENCOUNTER
Writer left voicemail for client informing her that Medical Records had a copy of the forms and forwarded them to writer to resend out in case of something awry with the first one sent out to Dr. Osorio for signing. Client does not need to make an extra drive to bring them in.    Writer was able to confirm the address to send the copies to with Dr. Osorio's  and place it in the mail bin in time to go out today.

## 2024-10-16 ENCOUNTER — TELEPHONE (OUTPATIENT)
Dept: PSYCHIATRY | Facility: CLINIC | Age: 19
End: 2024-10-16

## 2024-10-16 NOTE — TELEPHONE ENCOUNTER
LVM in reference to request for a form for employment.  Provider reports the form has been mailed to the office.

## 2024-10-18 NOTE — TELEPHONE ENCOUNTER
Writer left voicemail for client informing her that the paperwork was back at the office and asking if she might be able to swing by to pick it up today, 10/18/24, as it is not likely that more mail would be able to be sent out before Monday.     Writer asked client to call back to let us know.     Please forward client to writer!

## 2024-11-12 ENCOUNTER — TELEPHONE (OUTPATIENT)
Dept: PSYCHIATRY | Facility: CLINIC | Age: 19
End: 2024-11-12

## 2024-11-12 NOTE — TELEPHONE ENCOUNTER
Writer called and left voicemail for client as insurance has appeared to lapse for her appointment 11/19/24. Writer requested client call back with updated insurance information. Donar did leave detailed instructions on the self-pay process and set up the appointment as such just in case.       Writer gave detailed instructions on where to find both the virtual care behavioral health consent form and the acknowledgement of self-pay. Both will be required for a self-pay appointment, and the  consent form will be required whether self-pay or insured.      Donar also notified provider of possible insurance lapse.

## 2024-11-13 ENCOUNTER — TELEPHONE (OUTPATIENT)
Dept: PEDIATRICS CLINIC | Facility: CLINIC | Age: 19
End: 2024-11-13

## 2024-11-15 ENCOUNTER — TELEPHONE (OUTPATIENT)
Dept: PSYCHIATRY | Facility: CLINIC | Age: 19
End: 2024-11-15

## 2024-11-15 NOTE — TELEPHONE ENCOUNTER
Writer left another voicemail going over where to find the [facility] consent form and Acknowledgement of Self Pay. Writer also went over the $150 total and $30 pre-pay requirements.    Writer asked client to call back with any questions or insurance information.

## 2024-11-19 ENCOUNTER — TELEPHONE (OUTPATIENT)
Dept: PSYCHIATRY | Facility: CLINIC | Age: 19
End: 2024-11-19

## 2024-11-19 NOTE — TELEPHONE ENCOUNTER
Writer called and got in touch with client to reschedule her appointment as the pre-pay had not come through. Client had previous card on file and had not realized it.     Client scheduled for 11:30 on 11/26/24 and will be self-pay until her new job's benefits kick in.     Writer will call client on Friday if payment has not come through.   Acknowledgement form sent out again for new appointment.

## 2024-11-19 NOTE — TELEPHONE ENCOUNTER
Writer left another voicemail to inform client that the $30 pre-pay is due prior to today's appointment at 11 am or the appointment will have to be cancelled and rescheduled.    Client can pay through [employer] or call and ask to be transferred to office to pay over phone.

## 2024-11-20 ENCOUNTER — TELEPHONE (OUTPATIENT)
Age: 19
End: 2024-11-20

## 2024-11-20 NOTE — TELEPHONE ENCOUNTER
Patient on wait list for talk therapy services.  Called Pt to offer an appt. Pt scheduled for a virtual appt on 12/18/2024 @ 10:00am with Ernestine Johansen.

## 2024-11-22 ENCOUNTER — TELEPHONE (OUTPATIENT)
Dept: PSYCHIATRY | Facility: CLINIC | Age: 19
End: 2024-11-22

## 2024-11-22 NOTE — TELEPHONE ENCOUNTER
Client called and pre-paid over the phone and completed acknowledgment of self pay form.     Receipt is printed and in mail box as requested to go out.    Client did give wrong zip for card however it went through, but noting in case issues arise.

## 2024-11-22 NOTE — TELEPHONE ENCOUNTER
Writer called client and left voicemail to let her know that the pre-pay and the acknowledgment form did not come through on the E-Check In. Writer went over where to find the document center again and told client if she wants to call to pre-pay over the phone to ask for the office as we have the payment machines.

## 2024-11-25 NOTE — TELEPHONE ENCOUNTER
11/24/24 11:00 PM        The office's request has been received, reviewed, and the patient chart updated. The PCP has successfully been removed with a patient attribution note. This message will now be completed.        Thank you  Giovani Nj

## 2024-11-26 ENCOUNTER — TELEMEDICINE (OUTPATIENT)
Dept: PSYCHIATRY | Facility: CLINIC | Age: 19
End: 2024-11-26

## 2024-11-26 ENCOUNTER — TELEPHONE (OUTPATIENT)
Dept: PSYCHIATRY | Facility: CLINIC | Age: 19
End: 2024-11-26

## 2024-11-26 DIAGNOSIS — F41.1 GENERALIZED ANXIETY DISORDER: ICD-10-CM

## 2024-11-26 DIAGNOSIS — F60.3 BORDERLINE PERSONALITY DISORDER (HCC): ICD-10-CM

## 2024-11-26 DIAGNOSIS — F32.A DEPRESSION, UNSPECIFIED DEPRESSION TYPE: Primary | ICD-10-CM

## 2024-11-26 PROCEDURE — 99214 OFFICE O/P EST MOD 30 MIN: CPT | Performed by: PSYCHIATRY & NEUROLOGY

## 2024-11-26 RX ORDER — PROPRANOLOL HYDROCHLORIDE 10 MG/1
10 TABLET ORAL DAILY
Qty: 30 TABLET | Refills: 2 | Status: SHIPPED | OUTPATIENT
Start: 2024-11-26

## 2024-11-26 RX ORDER — DULOXETIN HYDROCHLORIDE 60 MG/1
60 CAPSULE, DELAYED RELEASE ORAL DAILY
Qty: 30 CAPSULE | Refills: 2 | Status: SHIPPED | OUTPATIENT
Start: 2024-11-26

## 2024-11-26 RX ORDER — DULOXETIN HYDROCHLORIDE 30 MG/1
30 CAPSULE, DELAYED RELEASE ORAL DAILY
Qty: 30 CAPSULE | Refills: 2 | Status: SHIPPED | OUTPATIENT
Start: 2024-11-26

## 2024-11-26 RX ORDER — LITHIUM CARBONATE 600 MG/1
600 CAPSULE ORAL
Qty: 30 CAPSULE | Refills: 1 | Status: SHIPPED | OUTPATIENT
Start: 2024-11-26

## 2024-11-26 NOTE — TELEPHONE ENCOUNTER
Writer called and left voicemail for client to call back to schedule 6 week follow up with Dr. Osorio.

## 2024-11-26 NOTE — PSYCH
Virtual Regular Visit    Verification of patient location:    Patient is located at Other in the following state in which I hold an active license [place]      Assessment/Plan:  Assessment & Plan  Depression, unspecified depression type    Orders:    DULoxetine (Cymbalta) 30 mg delayed release capsule; Take 1 capsule (30 mg total) by mouth daily With 60 mg    DULoxetine (CYMBALTA) 60 mg delayed release capsule; Take 1 capsule (60 mg total) by mouth daily    lithium 600 MG capsule; Take 1 capsule (600 mg total) by mouth daily at bedtime    Generalized anxiety disorder    Orders:    propranolol (INDERAL) 10 mg tablet; Take 1 tablet (10 mg total) by mouth daily    Borderline personality disorder (HCC)  Continue current meds          Problem List Items Addressed This Visit       Depression - Primary    Anxiety disorder    Borderline personality disorder (HCC)       Goals addressed in session: Goal 1          Reason for visit is   Chief Complaint   Patient presents with    Medication Management        Encounter provider Michelle Osorio MD      Recent Visits  Date Type Provider Dept   [facility]   11/22/24 Telephone [facility]   11/19/24 Telephone [facility]   Showing recent visits within past 7 days and meeting all other requirements  Today's Visits  Date Type Provider Dept   11/26/24 Telemedicine Michelle Osorio MD [facility]   Showing today's visits and meeting all other requirements  Future Appointments  No visits were found meeting these conditions.  Showing future appointments within next 150 days and meeting all other requirements       The patient was identified by name and date of birth. Avril Bethea was informed that this is a telemedicine visit and that the visit is being conducted throughthe Epic Embedded platform. She agrees to proceed..  My office door was closed. No one else was in  "the room.  She acknowledged consent and understanding of privacy and security of the video platform. The patient has agreed to participate and understands they can discontinue the visit at any time.    Patient is aware this is a billable service.     Subjective  Avril Bethea is a 19 y.o. adult with depression and anxiety presents for regular f/u  .  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 Denies panic attacks  Sleep - increased recently  Past Medical History:   Diagnosis Date    Anxiety     Asthma     Depression     Disruptive mood dysregulation disorder (HCC)     Psychiatric disorder     Vasovagal near syncope        Past Surgical History:   Procedure Laterality Date    WISDOM TOOTH EXTRACTION         Current Outpatient Medications   Medication Sig Dispense Refill    albuterol (Ventolin HFA) 90 mcg/act inhaler Inhale 2 puffs every 4 (four) hours as needed for wheezing or shortness of breath 1 Inhaler 0    DULoxetine (Cymbalta) 30 mg delayed release capsule Take 1 capsule (30 mg total) by mouth daily With 60 mg 30 capsule 2    DULoxetine (CYMBALTA) 60 mg delayed release capsule Take 1 capsule (60 mg total) by mouth daily 30 capsule 2    Levonorgestrel (Liletta, 52 MG,) 19.5 MCG/DAY IUD IUD 1 each by Intrauterine route once      lithium carbonate (LITHOBID) 450 mg CR tablet Take 1 tablet (450 mg total) by mouth daily 30 tablet 2    propranolol (INDERAL) 10 mg tablet Take 1 tablet (10 mg " total) by mouth daily 30 tablet 2     No current facility-administered medications for this visit.        Allergies   Allergen Reactions    Augmentin [Amoxicillin-Pot Clavulanate] Rash    Penicillins Hives       Review of Systems   Constitutional:  Negative for activity change and appetite change.   Psychiatric/Behavioral:  Positive for dysphoric mood. Negative for sleep disturbance and suicidal ideas. The patient is nervous/anxious.        Video Exam    There were no vitals filed for this visit.    Physical Exam  Constitutional:       Appearance: Normal appearance. She is obese.   Neurological:      Mental Status: She is alert.   Psychiatric:         Attention and Perception: Attention and perception normal.         Mood and Affect: Mood is anxious and depressed. Affect is blunt.         Speech: Speech normal.         Behavior: Behavior normal. Behavior is cooperative.         Thought Content: Thought content normal.         Judgment: Judgment normal.          Visit Time  Face to face  Visit Start Time: 11.28 am   Visit Stop Time: 11.38 am   Total Visit Duration:  20 minutes total spent in patient care

## 2024-11-26 NOTE — ASSESSMENT & PLAN NOTE
Orders:    DULoxetine (Cymbalta) 30 mg delayed release capsule; Take 1 capsule (30 mg total) by mouth daily With 60 mg    DULoxetine (CYMBALTA) 60 mg delayed release capsule; Take 1 capsule (60 mg total) by mouth daily    lithium 600 MG capsule; Take 1 capsule (600 mg total) by mouth daily at bedtime

## 2024-12-18 ENCOUNTER — TELEMEDICINE (OUTPATIENT)
Dept: BEHAVIORAL/MENTAL HEALTH CLINIC | Facility: CLINIC | Age: 19
End: 2024-12-18

## 2024-12-18 DIAGNOSIS — F60.3 BORDERLINE PERSONALITY DISORDER (HCC): ICD-10-CM

## 2024-12-18 DIAGNOSIS — F32.A DEPRESSION, UNSPECIFIED DEPRESSION TYPE: Primary | ICD-10-CM

## 2024-12-18 DIAGNOSIS — F41.1 GENERALIZED ANXIETY DISORDER: ICD-10-CM

## 2024-12-18 PROCEDURE — 90791 PSYCH DIAGNOSTIC EVALUATION: CPT | Performed by: SOCIAL WORKER

## 2024-12-18 NOTE — BH CRISIS PLAN
Client Name: Avril Bethea       Client YOB: 2005    Nando[place]ree Safety Plan      Creation Date: 12/18/24 Update Date: 12/18/25   Created By: Ernestine Johansen Last Updated By: Ernestine Johansen      Step 1: Warning Signs:   Warning Signs   self harm   isolation   not speaking            Step 2: Internal Coping Strategies:   Internal Coping Strategies   journaling   listening to music            Step 3: People and social settings that provide distraction:   Name Contact Information   Adriana Mcguire             Step 4: People whom I can ask for help during a crisis:      Name Contact Information    Adriana Mcguire     Ernestine [phone]      Step 5: Professionals or agencies I can contact during a crisis:      Clinican/Agency Name Phone Emergency Contact    Ernestine [phone]     Dr. Osorio [phone]       [facility] Emergency Department Emergency Department Phone Emergency Department Address    911          Crisis Phone Numbers:   Suicide Prevention Lifeline: Call or Text  988 Crisis Text Line: Text HOME to 616-767   Please note: Some [facility] do not have a separate number for Child/Adolescent specific crisis. If your county is not listed under Child/Adolescent, please call the adult number for your county      Adult Crisis Numbers: Child/Adolescent Crisis Numbers   [facility]: [phone] [facility]: [phone]   [facility]: [phone] [facility]: [phone]   [facility]: [phone] [place]: [phone]   [facility]: [phone] [facility]: [phone]   [facility]: [phone]   [facility]: [phone]   [facility]: [phone]   [facility]: [phone] (daytime) [phone] (after hours, weekends, holidays)      Step 6: Making the environment safer (plan for lethal means safety):   Patient did not identify any lethal methods: Yes     Optional: What is most important to me and worth  living for?   Sister   Adriana Collier-Kyree Safety Plan. Opal Collier and Noel Adorno. Used with permission of the authors.
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
## 2024-12-18 NOTE — BH TREATMENT PLAN
Outpatient Behavioral Health Psychotherapy Treatment Plan    Avril Bethea  2005     Date of Initial Psychotherapy Assessment: 12/1/24   Date of Current Treatment Plan: 12/18/24  Treatment Plan Target Date: 6/18/25  Treatment Plan Expiration Date: 6/18/25    Diagnosis:   1. Depression, unspecified depression type        2. Generalized anxiety disorder        3. Borderline personality disorder (HCC)            Area(s) of Need: stress reduction, support    Long Term Goal 1 (in the client's own words): Communicate my emotions    Stage of Change: Preparation    Target Date for completion: 12/25     Anticipated therapeutic modalities: Communication skills, supportive counseling, emotional regulation, mindfulness, CBT, DBT     People identified to complete this goal: Dr. Devon Mackenzie Sarah      Objective 1: (identify the means of measuring success in meeting the objective): I will be able to engage in open dialogue about my emotions at least once per week with one supportive indivdual as I deem this necessary, utilizing new feelings expressions.      Objective 2: (identify the means of measuring success in meeting the objective): I will be able to utilize emotional regulation and emotional communication to share my feelings with others at least 3 times per week, as self reported.       Long Term Goal 2 (in the client's own words): Control my anger    Stage of Change: Preparation    Target Date for completion: 12/25     Anticipated therapeutic modalities: Communicatoin, Anger management, Emotional regulation, Mindfulness, supportive counseling, CBT, DBT     People identified to complete this goal: Ernestine Mackenzie      Objective 1: (identify the means of measuring success in meeting the objective): I will be able to share my feelings as they occur with others, rather than holding them in, at least 5 times per week as self reported.      Objective 2: (identify the means of measuring success in meeting the objective):  I will remain compliant with medication management, as agreed upon during psychiatric medication management sessions 100% of the time.      I am currently under the care of a [facility] psychiatric provider: yes    My [facility] psychiatric provider is: Dr. Osorio    I am currently taking psychiatric medications: Yes, as prescribed    I feel that I will be ready for discharge from mental health care when I reach the following (measurable goal/objective): I am able to feel healthy and positive for a period of 3 months or more.     For children and adults who have a legal guardian:   Has there been any change to custody orders and/or guardianship status? No. If yes, attach updated documentation.    I have created my Crisis Plan and have been offered a copy of this plan    Behavioral Health Treatment Plan [facility]: Diagnosis and Treatment Plan explained to Avril Bethea acknowledges an understanding of their diagnosis. Avril Bethea agrees to this treatment plan.    I have been offered a copy of this Treatment Plan. yes

## 2025-01-10 ENCOUNTER — TELEMEDICINE (OUTPATIENT)
Dept: BEHAVIORAL/MENTAL HEALTH CLINIC | Facility: CLINIC | Age: 20
End: 2025-01-10
Payer: COMMERCIAL

## 2025-01-10 DIAGNOSIS — F41.1 GENERALIZED ANXIETY DISORDER: ICD-10-CM

## 2025-01-10 DIAGNOSIS — F60.3 BORDERLINE PERSONALITY DISORDER (HCC): ICD-10-CM

## 2025-01-10 DIAGNOSIS — F32.A DEPRESSION, UNSPECIFIED DEPRESSION TYPE: Primary | ICD-10-CM

## 2025-01-10 PROCEDURE — 90834 PSYTX W PT 45 MINUTES: CPT | Performed by: SOCIAL WORKER

## 2025-01-10 NOTE — PSYCH
Virtual Regular Visit    Verification of patient location:    Patient is located at Home in the following state in which I hold an active license [place]      Assessment/Plan:    Problem List Items Addressed This Visit          Behavioral Health    Depression - Primary    Anxiety disorder    Borderline personality disorder (HCC)       Goals addressed in session: Goal 1 and Goal 2     Depression Follow-up Plan Completed: No    Reason for visit is   Chief Complaint   Patient presents with    Virtual Regular Visit          Encounter provider Ernestine Johansen      Recent Visits  No visits were found meeting these conditions.  Showing recent visits within past 7 days and meeting all other requirements  Today's Visits  Date Type Provider Dept   01/10/25 Telemedicine Ernestine Johansen [facility] Therapist [facility]   Showing today's visits and meeting all other requirements  Future Appointments  No visits were found meeting these conditions.  Showing future appointments within next 150 days and meeting all other requirements       The patient was identified by name and date of birth. Avril Bethea was informed that this is a telemedicine visit and that the visit is being conducted through the Epic Embedded platform. She agrees to proceed..  My office door was closed. No one else was in the room.  She acknowledged consent and understanding of privacy and security of the video platform. The patient has agreed to participate and understands they can discontinue the visit at any time.    Patient is aware this is a billable service.     Subjective  Avril Bethea is a 19 y.o. female  .      HPI     Past Medical History:   Diagnosis Date    Anxiety     Asthma     Depression     Disruptive mood dysregulation disorder (HCC)     Psychiatric disorder     Vasovagal near syncope        Past Surgical History:   Procedure Laterality Date    WISDOM TOOTH EXTRACTION         Current Outpatient Medications   Medication Sig Dispense Refill     albuterol (Ventolin HFA) 90 mcg/act inhaler Inhale 2 puffs every 4 (four) hours as needed for wheezing or shortness of breath 1 Inhaler 0    DULoxetine (Cymbalta) 30 mg delayed release capsule Take 1 capsule (30 mg total) by mouth daily With 60 mg 30 capsule 2    DULoxetine (CYMBALTA) 60 mg delayed release capsule Take 1 capsule (60 mg total) by mouth daily 30 capsule 2    Levonorgestrel (Liletta, 52 MG,) 19.5 MCG/DAY IUD IUD 1 each by Intrauterine route once      lithium 600 MG capsule Take 1 capsule (600 mg total) by mouth daily at bedtime 30 capsule 1    propranolol (INDERAL) 10 mg tablet Take 1 tablet (10 mg total) by mouth daily 30 tablet 2     No current facility-administered medications for this visit.        Allergies   Allergen Reactions    Augmentin [Amoxicillin-Pot Clavulanate] Rash    Penicillins Hives       Review of Systems    Video Exam    There were no vitals filed for this visit.    Physical Exam     Behavioral Health Psychotherapy Progress Note    Psychotherapy Provided: Individual Psychotherapy     1. Depression, unspecified depression type        2. Generalized anxiety disorder        3. Borderline personality disorder (HCC)            Goals addressed in session: Goal 1 and Goal 2     DATA: Therapist met with Avril.  Client and therapist discussed her feelings of anger and difficulty communicating feelings. She noted anger when struggling with feelings of sadness.  Therapist discussed coping skills to release anger in appropriate ways.  Therapist and client also discussed sitting in feelings of sadness in order to become more comfortable with this emotion.  Client will attempt one thing for herself each month for self care.   During this session, this clinician used the following therapeutic modalities: Client-centered Therapy, Cognitive Behavioral Therapy, Cognitive Processing Therapy, Mindfulness-based Strategies, and Supportive Psychotherapy    Substance Abuse was not addressed during this  "session. If the client is diagnosed with a co-occurring substance use disorder, please indicate any changes in the frequency or amount of use: na. Stage of change for addressing substance use diagnoses: No substance use/Not applicable    ASSESSMENT:  Avril Bethea presents with a Euthymic/ normal, Anxious, and Depressed mood.     her affect is Normal range and intensity and Flat, which is congruent, with her mood and the content of the session. The client has not made progress on their goals.     Avril Bethea presents with a minimal risk of suicide, minimal risk of self-harm, and minimal risk of harm to others.    For any risk assessment that surpasses a \"low\" rating, a safety plan must be developed.    A safety plan was indicated: no  If yes, describe in detail na    PLAN: Between sessions, Avril Bethea will utilize learned skills. At the next session, the therapist will use Supportive Psychotherapy to address anger.    Behavioral Health Treatment Plan and Discharge Planning: Avril Bethea is aware of and agrees to continue to work on their treatment plan. They have identified and are working toward their discharge goals. yes    Depression Follow-up Plan Completed: No    Visit start and stop times:    01/10/25  Start Time: 1157  Stop Time: 1236  Total Visit Time: 39 minutes  "

## 2025-01-24 ENCOUNTER — TELEPHONE (OUTPATIENT)
Age: 20
End: 2025-01-24

## 2025-01-24 NOTE — TELEPHONE ENCOUNTER
Patient is calling regarding cancelling an appointment.    Date/Time: 1/24/25    Reason: Sick    Patient was rescheduled: YES [] NO [x]  If yes, when was Patient reschedule for: Has upcoming appt 2/7/25    Patient requesting call back to reschedule: YES [] NO [x]

## 2025-01-30 ENCOUNTER — TELEPHONE (OUTPATIENT)
Dept: PSYCHIATRY | Facility: CLINIC | Age: 20
End: 2025-01-30

## 2025-01-30 NOTE — TELEPHONE ENCOUNTER
Writer called and rescheduled client's appointment from today (bad reception) for 2/4/25 at 1:30 pm with Dr. Osorio

## 2025-02-04 ENCOUNTER — TELEPHONE (OUTPATIENT)
Dept: PSYCHIATRY | Facility: CLINIC | Age: 20
End: 2025-02-04

## 2025-02-04 ENCOUNTER — TELEMEDICINE (OUTPATIENT)
Dept: PSYCHIATRY | Facility: CLINIC | Age: 20
End: 2025-02-04
Payer: COMMERCIAL

## 2025-02-04 DIAGNOSIS — F32.A DEPRESSION, UNSPECIFIED DEPRESSION TYPE: Primary | ICD-10-CM

## 2025-02-04 DIAGNOSIS — F41.1 GENERALIZED ANXIETY DISORDER: ICD-10-CM

## 2025-02-04 DIAGNOSIS — G47.00 INSOMNIA, UNSPECIFIED TYPE: ICD-10-CM

## 2025-02-04 DIAGNOSIS — F60.3 BORDERLINE PERSONALITY DISORDER (HCC): ICD-10-CM

## 2025-02-04 PROCEDURE — 99214 OFFICE O/P EST MOD 30 MIN: CPT | Performed by: PSYCHIATRY & NEUROLOGY

## 2025-02-04 RX ORDER — LITHIUM CARBONATE 600 MG/1
600 CAPSULE ORAL
Qty: 30 CAPSULE | Refills: 1 | Status: SHIPPED | OUTPATIENT
Start: 2025-02-04

## 2025-02-04 RX ORDER — DULOXETIN HYDROCHLORIDE 60 MG/1
60 CAPSULE, DELAYED RELEASE ORAL DAILY
Qty: 30 CAPSULE | Refills: 1 | Status: SHIPPED | OUTPATIENT
Start: 2025-02-04

## 2025-02-04 RX ORDER — TRAZODONE HYDROCHLORIDE 50 MG/1
50 TABLET, FILM COATED ORAL
Qty: 30 TABLET | Refills: 1 | Status: SHIPPED | OUTPATIENT
Start: 2025-02-04

## 2025-02-04 RX ORDER — DULOXETIN HYDROCHLORIDE 30 MG/1
30 CAPSULE, DELAYED RELEASE ORAL DAILY
Qty: 30 CAPSULE | Refills: 1 | Status: SHIPPED | OUTPATIENT
Start: 2025-02-04

## 2025-02-04 NOTE — PSYCH
Virtual Regular Visit    Verification of patient location:    Patient is located at Other in the following state in which I hold an active license [place]      Assessment/Plan:  Assessment & Plan  Depression, unspecified depression type    Orders:    lithium 600 MG capsule; Take 1 capsule (600 mg total) by mouth daily at bedtime    DULoxetine (CYMBALTA) 60 mg delayed release capsule; Take 1 capsule (60 mg total) by mouth daily    DULoxetine (Cymbalta) 30 mg delayed release capsule; Take 1 capsule (30 mg total) by mouth daily With 60 mg    Generalized anxiety disorder  Propanolol 10 mg qd       Borderline personality disorder (HCC)  Start therapy          Problem List Items Addressed This Visit       Depression - Primary    Anxiety disorder    Borderline personality disorder (HCC)       Goals addressed in session: Goal 1     Depression Follow-up Plan Completed: Not applicable    Reason for visit is   Chief Complaint   Patient presents with    Medication Management        Encounter provider Michelle Osorio MD      Recent Visits  Date Type Provider Dept   01/30/25 Telephone [email or web address]   Showing recent visits within past 7 days and meeting all other requirements  Today's Visits  Date Type Provider Dept   02[facility]04/[facility]   Showing today's visits and meeting all other requirements  Future Appointments  No visits were found meeting these conditions.  Showing future appointments within next 150 days and meeting all other requirements       The patient was identified by name and date of birth. Avril OMID Bethea was informed that this is a telemedicine visit and that the visit is being conducted throughthe Epic Embedded platform. She agrees to proceed..  My office door was closed. No one else was in the room.  She acknowledged consent and understanding of privacy and security of the video platform. The patient has agreed to participate and understands they  can discontinue the visit at any time.    Patient is aware this is a billable service.     Subjective  Avril Bethea is a 19 y.o. female with depression and anxiety presents for regular f/u  .  Compliant with meds, denies SE  I reviewed the chart  Pt denies acute medical problems or other stressors; continues to work    HPI   Pt c/o poor sleep since last week. She reports 24 hrs of no sleep and then stayed up until 6 am and slept 2-3 hrs.  Pt reports racing thoughts, increased irritability and verbal anger outbursts; feels more social and more talkative.  She denies increased energy or elevated mood; denies depression.  Anxiety - daily episodes of tight chest; 30-60 min. Denies panic attacks    Past Medical History:   Diagnosis Date    Anxiety     Asthma     Depression     Disruptive mood dysregulation disorder (HCC)     Psychiatric disorder     Vasovagal near syncope        Past Surgical History:   Procedure Laterality Date    WISDOM TOOTH EXTRACTION         Current Outpatient Medications   Medication Sig Dispense Refill    albuterol (Ventolin HFA) 90 mcg/act inhaler Inhale 2 puffs every 4 (four) hours as needed for wheezing or shortness of breath 1 Inhaler 0    DULoxetine (Cymbalta) 30 mg delayed release capsule Take 1 capsule (30 mg total) by mouth daily With 60 mg 30 capsule 0    DULoxetine (CYMBALTA) 60 mg delayed release capsule Take 1 capsule (60 mg total) by mouth daily 30 capsule 0    Levonorgestrel (Liletta, 52 MG,) 19.5 MCG/DAY IUD IUD 1 each by Intrauterine route once      lithium 600 MG capsule Take 1 capsule (600 mg total) by mouth daily at bedtime 30 capsule 0    propranolol (INDERAL) 10 mg tablet Take 1 tablet (10 mg total) by mouth daily 30 tablet 0     No current facility-administered medications for this visit.        Allergies   Allergen Reactions    Augmentin [Amoxicillin-Pot Clavulanate] Rash    Penicillins Hives       Review of Systems   Constitutional:  Negative for activity change and  appetite change.   Psychiatric/Behavioral:  Positive for dysphoric mood and sleep disturbance. Negative for suicidal ideas. The patient is nervous/anxious.        Video Exam    There were no vitals filed for this visit.    Physical Exam  Constitutional:       Appearance: Normal appearance.   Neurological:      Mental Status: She is alert.   Psychiatric:         Attention and Perception: Attention and perception normal.         Mood and Affect: Mood is anxious. Mood is not depressed. Affect is blunt.         Speech: Speech normal.         Behavior: Behavior normal. Behavior is cooperative.         Thought Content: Thought content normal.         Judgment: Judgment normal.      Comments: Slightly irritable          Visit Time  Face to face  Visit Start Time: 1.30 pm   Visit Stop Time: 1.39 pm   Total Visit Duration:  20 minutes total spent in patient care

## 2025-02-04 NOTE — ASSESSMENT & PLAN NOTE
Orders:    lithium 600 MG capsule; Take 1 capsule (600 mg total) by mouth daily at bedtime    DULoxetine (CYMBALTA) 60 mg delayed release capsule; Take 1 capsule (60 mg total) by mouth daily    DULoxetine (Cymbalta) 30 mg delayed release capsule; Take 1 capsule (30 mg total) by mouth daily With 60 mg

## 2025-02-07 ENCOUNTER — TELEMEDICINE (OUTPATIENT)
Dept: BEHAVIORAL/MENTAL HEALTH CLINIC | Facility: CLINIC | Age: 20
End: 2025-02-07

## 2025-02-07 ENCOUNTER — TELEPHONE (OUTPATIENT)
Dept: PSYCHIATRY | Facility: CLINIC | Age: 20
End: 2025-02-07

## 2025-02-07 DIAGNOSIS — F41.1 GENERALIZED ANXIETY DISORDER: ICD-10-CM

## 2025-02-07 DIAGNOSIS — F32.A DEPRESSION, UNSPECIFIED DEPRESSION TYPE: Primary | ICD-10-CM

## 2025-02-07 DIAGNOSIS — F60.3 BORDERLINE PERSONALITY DISORDER (HCC): ICD-10-CM

## 2025-02-07 DIAGNOSIS — Z91.199 NO-SHOW FOR APPOINTMENT: ICD-10-CM

## 2025-02-07 DIAGNOSIS — F43.10 POST TRAUMATIC STRESS DISORDER (PTSD): ICD-10-CM

## 2025-02-07 PROCEDURE — NOSHOW: Performed by: SOCIAL WORKER

## 2025-02-07 NOTE — TELEPHONE ENCOUNTER
Patient is calling regarding cancelling an appointment.    Date/Time: 2/7/25 @ 12pm    Reason: no reason given    Patient was rescheduled: YES [] NO [x]  If yes, when was Patient reschedule for: n/a    Patient requesting call back to reschedule: YES [] NO [x]

## 2025-02-07 NOTE — PSYCH
No Call. No Show. No Charge    Avril Bethea no showed 02/07/25 appointment , staff called and left message to reschedule appointment     Treatment Plan not due at this session.

## 2025-02-21 ENCOUNTER — TELEMEDICINE (OUTPATIENT)
Dept: BEHAVIORAL/MENTAL HEALTH CLINIC | Facility: CLINIC | Age: 20
End: 2025-02-21

## 2025-02-21 DIAGNOSIS — F32.A DEPRESSION, UNSPECIFIED DEPRESSION TYPE: Primary | ICD-10-CM

## 2025-02-21 DIAGNOSIS — F41.1 GENERALIZED ANXIETY DISORDER: ICD-10-CM

## 2025-02-21 DIAGNOSIS — F60.3 BORDERLINE PERSONALITY DISORDER (HCC): ICD-10-CM
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
## 2025-03-21 ENCOUNTER — TELEMEDICINE (OUTPATIENT)
Dept: BEHAVIORAL/MENTAL HEALTH CLINIC | Facility: CLINIC | Age: 20
End: 2025-03-21

## 2025-03-21 DIAGNOSIS — F60.3 BORDERLINE PERSONALITY DISORDER (HCC): ICD-10-CM

## 2025-03-21 DIAGNOSIS — Z91.199 NO-SHOW FOR APPOINTMENT: ICD-10-CM

## 2025-03-21 DIAGNOSIS — F32.A DEPRESSION, UNSPECIFIED DEPRESSION TYPE: Primary | ICD-10-CM

## 2025-03-21 DIAGNOSIS — F43.10 POST TRAUMATIC STRESS DISORDER (PTSD): ICD-10-CM

## 2025-03-21 DIAGNOSIS — F41.1 GENERALIZED ANXIETY DISORDER: ICD-10-CM

## 2025-03-21 PROCEDURE — NOSHOW: Performed by: SOCIAL WORKER

## 2025-03-21 NOTE — PSYCH
No Call. No Show. No Charge    Avril Bethea no showed 03/21/25 appointment , staff called and left message to reschedule appointment     Treatment Plan not due at this session.

## 2025-04-04 ENCOUNTER — TELEMEDICINE (OUTPATIENT)
Dept: BEHAVIORAL/MENTAL HEALTH CLINIC | Facility: CLINIC | Age: 20
End: 2025-04-04
Payer: COMMERCIAL

## 2025-04-04 DIAGNOSIS — F43.10 POST TRAUMATIC STRESS DISORDER (PTSD): Primary | ICD-10-CM

## 2025-04-04 DIAGNOSIS — F60.3 BORDERLINE PERSONALITY DISORDER (HCC): ICD-10-CM

## 2025-04-04 DIAGNOSIS — F32.A DEPRESSION, UNSPECIFIED DEPRESSION TYPE: ICD-10-CM

## 2025-04-04 PROCEDURE — 90832 PSYTX W PT 30 MINUTES: CPT | Performed by: SOCIAL WORKER
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
## 2025-04-08 ENCOUNTER — TELEPHONE (OUTPATIENT)
Dept: PSYCHIATRY | Facility: CLINIC | Age: 20
End: 2025-04-08

## 2025-04-08 NOTE — TELEPHONE ENCOUNTER
Patient contacted the office to schedule a follow up visit with provider. Patient is now scheduled for 4/16/25  at 10:30a virtually.

## 2025-04-16 ENCOUNTER — TELEPHONE (OUTPATIENT)
Dept: PSYCHIATRY | Facility: CLINIC | Age: 20
End: 2025-04-16

## 2025-04-16 ENCOUNTER — TELEMEDICINE (OUTPATIENT)
Dept: PSYCHIATRY | Facility: CLINIC | Age: 20
End: 2025-04-16
Payer: COMMERCIAL

## 2025-04-16 DIAGNOSIS — F60.3 BORDERLINE PERSONALITY DISORDER (HCC): Primary | ICD-10-CM

## 2025-04-16 DIAGNOSIS — F41.1 GENERALIZED ANXIETY DISORDER: ICD-10-CM

## 2025-04-16 DIAGNOSIS — G47.00 INSOMNIA, UNSPECIFIED TYPE: ICD-10-CM

## 2025-04-16 DIAGNOSIS — F32.A DEPRESSION, UNSPECIFIED DEPRESSION TYPE: ICD-10-CM

## 2025-04-16 PROCEDURE — 99214 OFFICE O/P EST MOD 30 MIN: CPT | Performed by: PSYCHIATRY & NEUROLOGY

## 2025-04-16 RX ORDER — DULOXETIN HYDROCHLORIDE 60 MG/1
60 CAPSULE, DELAYED RELEASE ORAL DAILY
Qty: 30 CAPSULE | Refills: 2 | Status: SHIPPED | OUTPATIENT
Start: 2025-04-16

## 2025-04-16 RX ORDER — DULOXETIN HYDROCHLORIDE 30 MG/1
30 CAPSULE, DELAYED RELEASE ORAL DAILY
Qty: 30 CAPSULE | Refills: 2 | Status: SHIPPED | OUTPATIENT
Start: 2025-04-16

## 2025-04-16 RX ORDER — PROPRANOLOL HYDROCHLORIDE 10 MG/1
10 TABLET ORAL DAILY
Qty: 30 TABLET | Refills: 2 | Status: SHIPPED | OUTPATIENT
Start: 2025-04-16

## 2025-04-16 RX ORDER — LITHIUM CARBONATE 600 MG/1
600 CAPSULE ORAL
Qty: 30 CAPSULE | Refills: 2 | Status: SHIPPED | OUTPATIENT
Start: 2025-04-16

## 2025-04-16 NOTE — PSYCH
"MEDICATION MANAGEMENT NOTE    Name: Avril Bethea      : 2005      MRN: [id number]  Encounter Provider: Michelle Osorio MD  Encounter Date: 2025   Encounter department: [facility] OUTPATIENT    Insurance: Payor: [employer] / Plan: [employer] / Product Type: PPO Commercial /      Reason for Visit:   Chief Complaint   Patient presents with    Medication Management   :  Assessment & Plan  Depression, unspecified depression type         Generalized anxiety disorder         Borderline personality disorder (HCC)         Insomnia, unspecified type             Treatment Recommendations:    Educated about diagnosis and treatment modalities. Verbalizes understanding and agreement with the treatment plan.  Discussed self monitoring of symptoms, and symptom monitoring tools.  Discussed medications and if treatment adjustment was needed or desired.  I am scheduling this patient out for greater than 3 months: No    Medications Risks/Benefits:      Risks, Benefits And Possible Side Effects Of Medications:    Risks, benefits, and possible side effects of medications explained to Avril and she (or legal representative) verbalizes understanding and agreement for treatment.    Controlled Medication Discussion:     Not applicable      History of Present Illness       Pt presents for regular f/u .  Complaint with meds, denies SE  Pt denies acute medical problems  Pt continues to work full time, [employer]  Pt reports recent blood work , I have no results for review  Mood - mostly stable. When tired, gets irritable and \" kaplan\", but denies anger outbursts.  Pt does ADLs, stays social and active  Anxiety - controlled; denies panic attacks or racing thoughts  Sleep - improved; 8-10 hrs; off trazodone  Appetite - good      Review Of Systems: A review of systems is obtained and is negative except for the pertinent positives listed in HPI/Subjective above.  "     Current Rating Scores:         Areas of Improvement: reviewed in HPI/Subjective Section    Past Medical History:   Diagnosis Date    Anxiety     Asthma     Depression     Disruptive mood dysregulation disorder (HCC)     Psychiatric disorder     Vasovagal near syncope      Past Surgical History:   Procedure Laterality Date    WISDOM TOOTH EXTRACTION       Allergies:   Allergies   Allergen Reactions    Augmentin [Amoxicillin-Pot Clavulanate] Rash    Penicillins Hives       Current Outpatient Medications   Medication Instructions    albuterol (Ventolin HFA) 90 mcg/act inhaler 2 puffs, Inhalation, Every 4 hours PRN    DULoxetine (CYMBALTA) 60 mg, Oral, Daily    DULoxetine (CYMBALTA) 30 mg, Oral, Daily, With 60 mg    Levonorgestrel (Liletta, 52 MG,) 19.5 MCG/DAY IUD IUD 1 each, Once    lithium 600 mg, Oral, Daily at bedtime    propranolol (INDERAL) 10 mg, Oral, Daily    traZODone (DESYREL) 50 mg, Oral, Daily at bedtime        Substance Abuse History:    Tobacco, Alcohol and Drug Use History     Tobacco Use    Smoking status: Never    Smokeless tobacco: Never   Substance Use Topics    Alcohol use: Never    Drug use: Never     Alcohol Use: Not At Risk (4/20/2021)    AUDIT-C     Frequency of Alcohol Consumption: Never       Social History:    Social History     Socioeconomic History    Marital status: Single     Spouse name: Not on file    Number of children: Not on file    Years of education: Not on file    Highest education level: Not on file   Occupational History    Not on file   Other Topics Concern    Not on file   Social History Narrative    Not on file        Family Psychiatric History:     Family History   Problem Relation Age of Onset    Heart disease Mother     Arrhythmia Mother     Asthma Mother     Bipolar disorder Mother     Diabetes Father     Hyperlipidemia Father     Bipolar disorder Father     Diabetes Paternal Grandmother     Heart attack Paternal Grandmother     Hyperlipidemia Paternal Grandmother      Diabetes Paternal Grandfather     Hyperlipidemia Paternal Grandfather        Medical History Reviewed by provider this encounter:  Tobacco  Allergies  Meds  Problems  Med Hx  Surg Hx  Fam Hx          Objective   There were no vitals taken for this visit.     Mental Status Evaluation:    Appearance age appropriate, casually dressed   Behavior cooperative, calm   Speech normal rate, normal volume   Mood euthymic   Affect constricted   Thought Processes organized, goal directed   Thought Content no overt delusions   Perceptual Disturbances: no auditory hallucinations, no visual hallucinations   Abnormal Thoughts  Risk Potential Suicidal ideation - None  Homicidal ideation - None  Potential for aggression - No   Orientation normal   Memory recent and remote memory grossly intact   Consciousness alert and awake   Attention Span Concentration Span attention span and concentration are age appropriate   Intellect appears to be of average intelligence   Insight intact   Judgement intact   Muscle Strength and  Gait unable to assess today due to virtual visit   Motor activity unable to assess today due to virtual visit   Language normal   Fund of Knowledge adequate knowledge of current events   Pain none   Pain Scale 0       Laboratory Results: I have personally reviewed all pertinent laboratory/tests results        Suicide/Homicide Risk Assessment:    Risk of Harm to Self:  Based on today's assessment, Avril presents the following risk of harm to self: none    Risk of Harm to Others:  Based on today's assessment, Avril presents the following risk of harm to others: none    The following interventions are recommended: Continue medication management. Continue psychotherapy.    Psychotherapy Provided:     Individual psychotherapy provided: No    Treatment Plan:    Completed and signed during the session: Not applicable - Treatment Plan to be completed by [facility] therapist.    Goals: Progress  towards Treatment Plan goals -  stable.    Depression Follow-up Plan Completed: Not applicable    Note Share:        Administrative Statements   Administrative Statements   Encounter provider Michelle Osorio MD    The Patient is located at Home and in the following state in which I hold an active license [place].    The patient was identified by name and date of birth. Avril Bethea was informed that this is a telemedicine visit and that the visit is being conducted through the Epic Embedded platform. She agrees to proceed..  My office door was closed. No one else was in the room.  She acknowledged consent and understanding of privacy and security of the video platform. The patient has agreed to participate and understands they can discontinue the visit at any time.    I have spent a total time of 20 minutes in caring for this patient on the day of the visit/encounter including Risks and benefits of tx options, Instructions for management, Documenting in the medical record, and Reviewing/placing orders in the medical record (including tests, medications, and/or procedures), not including the time spent for establishing the audio/video connection.    Visit Time  Face to face  Visit Start Time: 10.30 am   Visit Stop Time: 10.39 am   Total Visit Duration:  20 minutes total spent in patient care    Michelle Osorio MD 04/16/25

## 2025-04-18 ENCOUNTER — TELEMEDICINE (OUTPATIENT)
Dept: BEHAVIORAL/MENTAL HEALTH CLINIC | Facility: CLINIC | Age: 20
End: 2025-04-18

## 2025-04-18 DIAGNOSIS — F43.10 POST TRAUMATIC STRESS DISORDER (PTSD): Primary | ICD-10-CM

## 2025-04-18 DIAGNOSIS — F60.3 BORDERLINE PERSONALITY DISORDER (HCC): ICD-10-CM

## 2025-04-18 DIAGNOSIS — Z91.199 NO-SHOW FOR APPOINTMENT: ICD-10-CM

## 2025-04-18 DIAGNOSIS — F32.A DEPRESSION, UNSPECIFIED DEPRESSION TYPE: ICD-10-CM

## 2025-04-18 DIAGNOSIS — F41.1 GENERALIZED ANXIETY DISORDER: ICD-10-CM

## 2025-04-18 NOTE — PSYCH
No Call. No Show. No Charge    Avril Bethea no showed 04/18/25 appointment , staff called and left message to reschedule appointment     Treatment Plan not due at this session.

## 2025-04-30 ENCOUNTER — TELEPHONE (OUTPATIENT)
Dept: PSYCHIATRY | Facility: CLINIC | Age: 20
End: 2025-04-30

## 2025-04-30 ENCOUNTER — DOCUMENTATION (OUTPATIENT)
Dept: BEHAVIORAL/MENTAL HEALTH CLINIC | Facility: CLINIC | Age: 20
End: 2025-04-30

## 2025-04-30 DIAGNOSIS — F60.3 BORDERLINE PERSONALITY DISORDER (HCC): ICD-10-CM

## 2025-04-30 DIAGNOSIS — F43.10 POST TRAUMATIC STRESS DISORDER (PTSD): Primary | ICD-10-CM

## 2025-04-30 DIAGNOSIS — F32.A DEPRESSION, UNSPECIFIED DEPRESSION TYPE: ICD-10-CM

## 2025-04-30 DIAGNOSIS — F41.1 GENERALIZED ANXIETY DISORDER: ICD-10-CM

## 2025-04-30 NOTE — TELEPHONE ENCOUNTER
Pt is now d/c from Talk Therapy services with Ernestine Johansen at [facility] as of 4/30/25.

## 2025-04-30 NOTE — PROGRESS NOTES
Psychotherapy Discharge Summary    Preferred Name: Avril Bethea  YOB: 2005    Admission date to psychotherapy: Prior to 11/3/2022    Referred by: Psychiatry    Presenting Problem: Anger, Mood instability, anxiety    Course of treatment included : individual therapy     Progress/Outcome of Treatment Goals (brief summary of course of treatment) Client continued to struggle with anger and irritability.  She had difficulty with sharing feelings and adjusting to new providers, as provider had recently changed multiple times.     Treatment Complications (if any): Changes in providers    Treatment Progress: poor    Current [facility] Psychiatric Provider: Ernestine Johansen    Discharge Medications include: Cymbalta, Lithium, Inderal, Trazodone    Discharge Date: 4/30/25    Discharge Diagnosis: No diagnosis found.    Criteria for Discharge:  Client requested discharge.  Client is seeing a therapist outside of this agency, but will maintain psychiatry.    Patient is cleared to return to Ernestine Johansen for continued treatment.    Rationale: Should client decide to return to treatment provider will resume with client, should that be her choice.     Aftercare recommendations include (include specific referral names and phone numbers, if appropriate): continue with treatment with psychiatry and therapy as they remain necessary for improved stability.    Prognosis: fair

## 2025-04-30 NOTE — TELEPHONE ENCOUNTER
Called pt and left VM regarding calling pt back from  that we received regarding d/c talk therapy services with Ernestine Johansen at [facility] due to finding services elsewhere. Pt is still active with [facility]. All appts with Ernestine Johansen are now cancelled.

## 2025-06-25 ENCOUNTER — TELEPHONE (OUTPATIENT)
Dept: PSYCHIATRY | Facility: CLINIC | Age: 20
End: 2025-06-25

## 2025-06-25 NOTE — TELEPHONE ENCOUNTER
Writer called and left voicemail for client to remind her to schedule follow up with Dr. Devon valdes in mid-July.

## 2025-07-24 ENCOUNTER — TELEPHONE (OUTPATIENT)
Dept: PSYCHIATRY | Facility: CLINIC | Age: 20
End: 2025-07-24

## 2025-07-24 NOTE — TELEPHONE ENCOUNTER
Writer called and left voicemail for client letting her know of yearly paperwork due for her appointment 7/28/25 with Dr. Osorio.    Writer also informed client that the independence admin was termed and writer found active [employer] insurance. Client advised to call back if that is incorrect.

## 2025-07-28 ENCOUNTER — TELEPHONE (OUTPATIENT)
Dept: PSYCHIATRY | Facility: CLINIC | Age: 20
End: 2025-07-28

## 2025-08-06 ENCOUNTER — TELEPHONE (OUTPATIENT)
Age: 20
End: 2025-08-06

## 2025-08-12 ENCOUNTER — TELEMEDICINE (OUTPATIENT)
Dept: PSYCHIATRY | Facility: CLINIC | Age: 20
End: 2025-08-12
Payer: COMMERCIAL

## 2025-08-12 ENCOUNTER — TELEPHONE (OUTPATIENT)
Dept: PSYCHIATRY | Facility: CLINIC | Age: 20
End: 2025-08-12

## 2025-08-13 ENCOUNTER — TELEPHONE (OUTPATIENT)
Dept: PSYCHIATRY | Facility: CLINIC | Age: 20
End: 2025-08-13